# Patient Record
Sex: FEMALE | Race: WHITE | Employment: OTHER | URBAN - METROPOLITAN AREA
[De-identification: names, ages, dates, MRNs, and addresses within clinical notes are randomized per-mention and may not be internally consistent; named-entity substitution may affect disease eponyms.]

---

## 2017-06-22 ENCOUNTER — TELEPHONE (OUTPATIENT)
Dept: ORTHOPEDIC SURGERY | Age: 77
End: 2017-06-22

## 2017-06-26 RX ORDER — AMOXICILLIN 500 MG/1
CAPSULE ORAL
Qty: 4 CAPSULE | Refills: 2 | Status: SHIPPED | OUTPATIENT
Start: 2017-06-26

## 2020-02-07 ENCOUNTER — HOSPITAL ENCOUNTER (OUTPATIENT)
Dept: PHYSICAL THERAPY | Age: 80
Setting detail: THERAPIES SERIES
Discharge: HOME | End: 2020-02-07
Payer: MEDICARE

## 2020-02-07 PROCEDURE — 97016 VASOPNEUMATIC DEVICE THERAPY: CPT

## 2020-02-07 PROCEDURE — 97161 PT EVAL LOW COMPLEX 20 MIN: CPT

## 2020-02-07 PROCEDURE — 97110 THERAPEUTIC EXERCISES: CPT

## 2020-02-07 NOTE — PLAN OF CARE
[facility]  Phone: [phone]   Fax:     [phone]                                                       Physical Therapy Certification    Dear Referring Practitioner: Susan Carmona,    We had the pleasure of evaluating the following patient for physical therapy services at [address] [place] [address]. A summary of our findings can be found in the initial assessment below. This includes our plan of care. If you have any questions or concerns regarding these findings, please do not hesitate to contact me at the office phone number checked above. Thank you for the referral.       Physician Signature:_______________________________Date:__________________  By signing above (or electronic signature), therapists plan is approved by physician      Patient: Yusra Kumar   : [birth year]   MRN: [id number]  Referring Physician: Referring Practitioner: Susan Carmona      Evaluation Date: 2020      Medical Diagnosis Information:  Diagnosis: L knee OA s/p L TKA - 2/3/20   Treatment Diagnosis: M17.11, Z96.659                                         Insurance information: PT Insurance Information: Aetna Medicare     Precautions/ Contra-indications: None  Latex Allergy:  [x]NO      []YES  Preferred Language for Healthcare:   [x]English       []other:    SUBJECTIVE: Patient stated complaint: Patient presents to physical therapy 4 days s/p left TKA on 2/3/20. Reports feeling really great for first couple days post-op, was trying to walk around the house every hour as instructed and was even able to stand to help make dinner. But today and yesterday she has been quite a bit more sore. She is taking Percocet as needed for pain control. Icing and elevating as instructed. Currently ambulating with a wheeled walker.  Not really having much difficulty sleeping. Relevant Medical History: Right TKA 7 years ago, L MICHAEL May 2016  Functional Scale/Score: LEFS - 86% impairment    Pain Scale: 6/10  Easing factors: pain meds PRN, ice, elevation  Provocative factors: sitting, standing, transfers, ambulation, squatting, stairs, kneeling     Type: []Constant   [x]Intermittent  []Radiating [x]Localized []other:     Numbness/Tingling: Present in anterior knee    Occupation/School: Retired teacher    Living Status/Prior Level of Function: Independent with ADLs and IADLs, able to exercise (walking, Pilates reformer) without increased symptoms or restriction    OBJECTIVE:     ROM LEFT RIGHT   Knee ext -6 0   Knee Flex 75 120   Strength  LEFT RIGHT   HIP Flexors     HIP Abductors 4/5 5/5   HIP Ext     Hip ER     Knee EXT (quad) Trace 5/5   Knee Flex (HS) 3+/5 5/5     Reflexes/Sensation:    [x]Dermatomes/Myotomes intact    [x]Reflexes equal and normal bilaterally   []Other:    Joint mobility:    []Normal    [x]Hypo - L knee   []Hyper    Palpation: tender to palpation of posterior knee    Functional Mobility/Transfers: UE assist for sit to stand, min assist to lift LLE for supine-to-sit transfers    Posture: WNL    Bandages/Dressings/Incisions: Dressing still in place, no signs of infection    Gait: (include devices/WB status) antalgic with use of wheeled walker, poor left push off with decreased left knee flexion during swing    Orthopedic Special Tests: NA                       [x] Patient history, allergies, meds reviewed. Medical chart reviewed. See intake form. Review Of Systems (ROS):  [x]Performed Review of systems (Integumentary, CardioPulmonary, Neurological) by intake and observation. Intake form has been scanned into medical record. Patient has been instructed to contact their primary care physician regarding ROS issues if not already being addressed at this time.       Co-morbidities/Complexities (which will affect course of rehabilitation):   []None Arthritic conditions   []Rheumatoid arthritis (M05.9)  [x]Osteoarthritis (M19.91)   Cardiovascular conditions   [x]Hypertension (I10)  []Hyperlipidemia (E78.5)  []Angina pectoris (I20)  []Atherosclerosis (I70)  []CVA Musculoskeletal conditions   []Disc pathology   []Congenital spine pathologies   []Prior surgical intervention  []Osteoporosis (M81.8)  [x]Osteopenia (M85.8)   Endocrine conditions   []Hypothyroid (E03.9)  []Hyperthyroid Gastrointestinal conditions   []Constipation (O30.83)   Metabolic conditions   []Morbid obesity (E66.01)  []Diabetes type 1(E10.65) or 2 (E11.65)   []Neuropathy (G60.9)     Pulmonary conditions   []Asthma (J45)  []Coughing   []COPD (J44.9)   Psychological Disorders  []Anxiety (F41.9)  []Depression (F32.9)   []Other:   [x]Other:   Hx of CA       Barriers to/and or personal factors that will affect rehab potential:              []Age  []Sex    []Smoker              []Motivation/Lack of Motivation                        []Co-Morbidities              []Cognitive Function, education/learning barriers              []Environmental, home barriers              []profession/work barriers  []past PT/medical experience  []other:  Justification: No barriers noted    Falls Risk Assessment (30 days):   [x] Falls Risk assessed and no intervention required. [] Falls Risk assessed and Patient requires intervention due to being higher risk   TUG score (>12s at risk):     [] Falls education provided, including         ASSESSMENT: Patient is a 61-year-old female who presents to physical therapy 4 days s/p left TKA.    Functional Impairments:     [x]Noted lumbar/proximal hip/LE hypomobility   [x]Decreased LE functional ROM   [x]Decreased core/proximal hip strength and neuromuscular control   [x]Decreased LE functional strength   [x]Reduced balance/proprioceptive control   []other:      Functional Activity Limitations (from functional questionnaire and intake)   [x]Reduced ability to tolerate prolonged

## 2020-02-07 NOTE — FLOWSHEET NOTE
[facility]  [address] [address] [facility]  Phone: [phone] Fax: [phone]    Physical Therapy Treatment Note/ Progress Report:     Date:  2020    Patient Name:  Ivonne Field    :  11[birth year]  MRN: [id number]  Restrictions/Precautions:    Medical/Treatment Diagnosis Information:  · Diagnosis: L knee OA s/p L TKA - 2/3/20  · Treatment Diagnosis: M17.11, I03.235  Insurance/Certification information:  PT Insurance Information: Aetna Medicare  Physician Information:  Referring Practitioner: Yordy Ash  Plan of care signed (Y/N):     Date of Patient follow up with Physician: 20     Progress Report: []  Yes  [x]  No     Date Range for reporting period:  Beginnin20  Ending:  NA    Progress report due (10 Rx/or 30 days whichever is less): 85     Recertification due (POC duration/ or 90 days whichever is less): 4/3/20     Visit # Insurance Allowable Auth Needed   1 Medical necessity []Yes   [x]No     Latex Allergy:  [x]NO      []YES  Preferred Language for Healthcare:   [x]English       []other:  Functional Scale: LEFS - 86% impairment   Date assessed:20    Pain level:  6/10     SUBJECTIVE:  See eval    OBJECTIVE: See eval   Observation:    Test measurements:      RESTRICTIONS/PRECAUTIONS: Hx of L MICHAEL, R TKA    Exercises/Interventions:     Therapeutic Ex (86454)   Min: 20 Sets/sec Reps Notes/CUES   Retro Stepper/BIKE      Alter G      Supine heel slides 10 10 With strap   QS 5 10 Supine, towel under knee   Long sitting calf stretch 30 3 towel   Long sitting HS stretch 30 3    Heel prop 2'  towel   Sportcord March      SLR 1 5 0# w/ manual assist   SAQ      Clam ABD      Hip Ext [place]      BOSU fwd/side lunge      BOSU squat      Leg Press Iso/Con/Ecc 0-      Cybex HS curl      TKE      Glute side walks      RDL      Slide Lunge      Slide HS eccentrics      Step ups/ecc step down      Swissball wall rolls- in SLS- (30800)Reviewed/Progressed HEP activities related to improving balance, coordination, kinesthetic sense, posture, motor skill, proprioception of core, proximal hip and LE for self care, mobility, lifting, and ambulation/stair navigation      Manual Treatments:  PROM / STM / Oscillations-Mobs:  G-I, II, III, IV (PA's, Inf., Post.)  [] (45991) Provided manual therapy to mobilize LE, proximal hip and/or LS spine soft tissue/joints for the purpose of modulating pain, promoting relaxation,  increasing ROM, reducing/eliminating soft tissue swelling/inflammation/restriction, improving soft tissue extensibility and allowing for proper ROM for normal function with self care, mobility, lifting and ambulation. Modalities:     [x] GAME READY (VASO)- for significant edema, swelling, pain control. - to L knee with patient in supine with LLE elevated on 2 pillows, moderate compression x 10 minutes    Charges:  Timed Code Treatment Minutes: 20   Total Treatment Minutes: 48      [x] EVAL (LOW) 05696 (typically 20 minutes face-to-face)  [] EVAL (MOD) 68771 (typically 30 minutes face-to-face)  [] EVAL (HIGH) 31074 (typically 45 minutes face-to-face)  [] RE-EVAL     [x] DD(11651) x 1    [] IONTO (60136)  [] NMR (21945) x     [x] VASO (26425)  [] Manual (57485) x     [] Other:  [] TA (13381)x     [] Mech Traction (44524)  [] ES(attended) (75009)     [] ES (un) (14979): If [facility] Please Indicate Time In/Out  CPT Code Time in Time out                                   GOALS:  Patient stated goal: pain-free, return to PLOF including unlimited walking/exercise  [] Progressing: [] Met: [] Not Met: [] Adjusted    Therapist goals for Patient:   Short Term Goals: To be achieved in: 2 weeks  1. Independent in HEP and progression per patient tolerance, in order to prevent re-injury. [] Progressing: [] Met: [] Not Met: [] Adjusted  2.  Patient will have a decrease in pain to facilitate improvement in movement, function, and ADLs as indicated by Functional Deficits. [] Progressing: [] Met: [] Not Met: [] Adjusted    Long Term Goals: To be achieved in: 8 weeks  1. Disability index score of 30% or less for the LEFS to assist with reaching prior level of function. [] Progressing: [] Met: [] Not Met: [] Adjusted  2. Patient will demonstrate increased AROM to 0-120 or greater to allow for proper joint functioning as indicated by patients Functional Deficits. [] Progressing: [] Met: [] Not Met: [] Adjusted  3. Patient will demonstrate an increase in Strength to good proximal hip strength and control, within 5lb HHD in LE to allow for proper functional mobility as indicated by patients Functional Deficits. [] Progressing: [] Met: [] Not Met: [] Adjusted  4. Patient will return to sitting, standing, transfers, ambulation, squatting, stairs, kneeling  functional activities without increased symptoms or restriction. [] Progressing: [] Met: [] Not Met: [] Adjusted    ASSESSMENT:  See eval    Return to Play: (if applicable)   []  Stage 1: Intro to Strength   []  Stage 2: Return to Run and Strength   []  Stage 3: Return to Jump and Strength   []  Stage 4: Dynamic Strength and Agility   []  Stage 5: Sport Specific Training     []  Ready to Return to Play, Meets All Above Stages   []  Not Ready for Return to Sports   Comments:            Treatment/Activity Tolerance:  [x] Patient tolerated treatment well [] Patient limited by fatique  [] Patient limited by pain  [] Patient limited by other medical complications  [] Other:     Overall Progression Towards Functional goals/ Treatment Progress Update:  [] Patient is progressing as expected towards functional goals listed. [] Progression is slowed due to complexities/Impairments listed. [] Progression has been slowed due to co-morbidities.   [x] Plan just implemented, too soon to assess goals progression <30days   [] Goals require adjustment due to lack of progress  [] Patient is not progressing as expected and requires additional follow up with physician  [] Other    Prognosis for POC: [x] Good [] Fair  [] Poor    Patient requires continued skilled intervention: [x] Yes  [] No        PLAN: See eval  [] Continue per plan of care [] Alter current plan (see comments)  [x] Plan of care initiated [] Hold pending MD visit [] Discharge    Electronically signed by: Amparo Warner PT    Note: If patient does not return for scheduled/recommended follow up visits, this note will serve as a discharge from care along with the most recent update on progress.

## 2020-02-10 ENCOUNTER — HOSPITAL ENCOUNTER (OUTPATIENT)
Dept: PHYSICAL THERAPY | Age: 80
Setting detail: THERAPIES SERIES
Discharge: HOME | End: 2020-02-10
Payer: MEDICARE

## 2020-02-10 PROCEDURE — 97140 MANUAL THERAPY 1/> REGIONS: CPT

## 2020-02-10 PROCEDURE — 97110 THERAPEUTIC EXERCISES: CPT

## 2020-02-10 NOTE — FLOWSHEET NOTE
[place] [address] [address] [facility]  Phone: [phone] Fax: [phone]    Physical Therapy Treatment Note/ Progress Report:     Date:  2/10/2020    Patient Name:  Allen Irwin    :  [birth year]  MRN: [id number]  Restrictions/Precautions:    Medical/Treatment Diagnosis Information:  · Diagnosis: L knee OA s/p L TKA - 2/3/20  · Treatment Diagnosis: M17.11, P63.089  Insurance/Certification information:  PT Insurance Information: Aetna Medicare  Physician Information:  Referring Practitioner: Ester Forbes  Plan of care signed (Y/N):     Date of Patient follow up with Physician: 20     Progress Report: []  Yes  [x]  No     Date Range for reporting period:  Beginnin20  Ending:  NA    Progress report due (10 Rx/or 30 days whichever is less): 99     Recertification due (POC duration/ or 90 days whichever is less): 4/3/20     Visit # Insurance Allowable Auth Needed   2 Medical necessity []Yes   [x]No     Latex Allergy:  [x]NO      []YES  Preferred Language for Healthcare:   [x]English       []other:  Functional Scale: LEFS - 86% impairment   Date assessed:20    Pain level:  6/10     SUBJECTIVE:  Patient reports left knee is somewhat sore today. Having a hard time performing HEP SLR without assistance.      OBJECTIVE: tender through left knee, knee flexion approximately 80%   Observation: improved knee flexion during swing phase after treatment   Test measurements:      RESTRICTIONS/PRECAUTIONS: Hx of L MICHAEL, R TKA    Exercises/Interventions:     Therapeutic Ex ()   Min: 22 Sets/sec Reps Notes/CUES   Retro Stepper/BIKE      Calf stretch 30\" 3          QS 5 10 Supine, towel under knee   Long sitting calf stretch towel   Long sitting HS stretch    Heel prop   towel   Sportcord March      SLR 1 10 0# w/ manual assist   SL hip ABD 1 10    Calf raise 1 10    Standing hip abd 1 10 Each leg   BOSU fwd/side lunge      BOSU squat      Leg Press Iso/Con/Ecc 0-      HS curl 1 10    TKE      Glute side walks      RDL      Slide Lunge      Slide HS eccentrics      Step ups/ecc step down      Swissball wall rolls- in SLS- hip drive      Quad hip ext/wall-ball rolls      Gait training 5'           Manual Intervention (22991)  Min: 15'      Knee mobs/PROM/STM 15'     Tib/Fem Mobs      Patella Mobs      Ankle mobs                  NMR re-education (08529)  Min:   CUES NEEDED   Northern [place]/Biofeedback 10/10      BFR      G. Med activation      Hip Ext full ROM/ G. Activation      Bosu Bal and Prop- G Med      Single leg stance/Balance/Prop      Bosu Retro G. Med act      Prone Hip froggers- sliders/elevated            Therapeutic Activity (31787)  Min:      Ladders      [place]      Dynamic Balance                            Therapeutic Exercise and NMR EXR  [x] (37048) Provided verbal/tactile cueing for activities related to strengthening, flexibility, endurance, ROM for improvements in LE, proximal hip, and core control with self care, mobility, lifting, ambulation. [x] (01795) Provided verbal/tactile cueing for activities related to improving balance, coordination, kinesthetic sense, posture, motor skill, proprioception  to assist with LE, proximal hip, and core control in self care, mobility, lifting, ambulation and eccentric single leg control.      NMR and Therapeutic Activities:    [x] (40358 or 57041) Provided verbal/tactile cueing for activities related to improving balance, coordination, kinesthetic sense, posture, motor skill, proprioception and motor activation to allow for proper function of core, proximal hip and LE with self care and ADLs and functional mobility.   [] (31130) Gait Re-education- Provided training and instruction to the patient for proper LE, core and proximal hip recruitment and positioning and eccentric body weight control with ambulation re-education including up and down stairs     Home Exercise Program:    [x] (90236) Reviewed/Progressed HEP activities related to strengthening, flexibility, endurance, ROM of core, proximal hip and LE for functional self-care, mobility, lifting and ambulation/stair navigation   [] (51937)Reviewed/Progressed HEP activities related to improving balance, coordination, kinesthetic sense, posture, motor skill, proprioception of core, proximal hip and LE for self care, mobility, lifting, and ambulation/stair navigation      Manual Treatments:  PROM / STM / Oscillations-Mobs:  G-I, II, III, IV (PA's, Inf., Post.)  [] (09538) Provided manual therapy to mobilize LE, proximal hip and/or LS spine soft tissue/joints for the purpose of modulating pain, promoting relaxation,  increasing ROM, reducing/eliminating soft tissue swelling/inflammation/restriction, improving soft tissue extensibility and allowing for proper ROM for normal function with self care, mobility, lifting and ambulation. Modalities:     [] GAME READY (VASO)- for significant edema, swelling, pain control. - to L knee with patient in supine with LLE elevated on 2 pillows, moderate compression x 10 minutes    Charges:  Timed Code Treatment Minutes: 37   Total Treatment Minutes: 37      [] EVAL (LOW) 86479 (typically 20 minutes face-to-face)  [] EVAL (MOD) 24404 (typically 30 minutes face-to-face)  [] EVAL (HIGH) 29180 (typically 45 minutes face-to-face)  [] RE-EVAL     [x] ZD(38884) x 1    [] IONTO (20297)  [] NMR (88178) x     [] VASO (56485)  [x] Manual (69068) x1     [] Other:  [] TA (33283)x     [] Mech Traction (79757)  [] ES(attended) (78711)     [] ES (un) (90854): If [facility] Please Indicate Time In/Out  CPT Code Time in Time out                                   GOALS:  Patient stated goal: pain-free, return to PLOF including unlimited walking/exercise  [] Progressing: [] Met: [] Not Met: [] Adjusted    Therapist goals for Patient:   Short Term Goals: To be achieved in: 2 weeks  1.  Independent in HEP and progression per

## 2020-02-12 ENCOUNTER — HOSPITAL ENCOUNTER (OUTPATIENT)
Dept: PHYSICAL THERAPY | Age: 80
Setting detail: THERAPIES SERIES
Discharge: HOME | End: 2020-02-12
Payer: MEDICARE

## 2020-02-12 PROCEDURE — 97140 MANUAL THERAPY 1/> REGIONS: CPT

## 2020-02-12 PROCEDURE — 97110 THERAPEUTIC EXERCISES: CPT

## 2020-02-12 NOTE — FLOWSHEET NOTE
[place] [address] [address] [place] [facility]  Phone: [phone] Fax: [phone]    Physical Therapy Treatment Note/ Progress Report:     Date:  2020    Patient Name:  Taylor Dutta    :  [birth year]10  MRN: [id number]  Restrictions/Precautions:    Medical/Treatment Diagnosis Information:  · Diagnosis: L knee OA s/p L TKA - 2/3/20  · Treatment Diagnosis: M17.11, J73.422  Insurance/Certification information:  PT Insurance Information: Aetna Medicare  Physician Information:  Referring Practitioner: Norine Dubin  Plan of care signed (Y/N):     Date of Patient follow up with Physician: 20     Progress Report: []  Yes  [x]  No     Date Range for reporting period:  Beginnin20  Ending:  NA    Progress report due (10 Rx/or 30 days whichever is less): 3/1/71     Recertification due (POC duration/ or 90 days whichever is less): 4/3/20     Visit # Insurance Allowable Auth Needed   3 Medical necessity []Yes   [x]No     Latex Allergy:  [x]NO      []YES  Preferred Language for Healthcare:   [x]English       []other:  Functional Scale: LEFS - 86% impairment   Date assessed:20    Pain level:  5/10     SUBJECTIVE:  Patient reports left knee is doing better. She feels that she is better able to perform HEP with good quad activation. Reports 5/10 pain levels when she does certain movements.     OBJECTIVE: improved quad activation, no assist required for SLR   Observation: improved knee flexion during swing phase after treatment, able to walk with standard cane   Test measurements:      RESTRICTIONS/PRECAUTIONS: Hx of L MICHAEL, R TKA    Exercises/Interventions:     Therapeutic Ex (86874)   Min: 30 Sets/sec Reps Notes/CUES   Retro Stepper/BIKE 5'     Calf stretch 30\" 3          QS 5 10 Supine, towel under knee   Long sitting calf stretch towel   Long sitting HS stretch    Heel prop   towel   Sportcord     SLR 1 10 0# w/ manual assist

## 2020-02-17 ENCOUNTER — HOSPITAL ENCOUNTER (OUTPATIENT)
Dept: PHYSICAL THERAPY | Age: 80
Setting detail: THERAPIES SERIES
Discharge: HOME | End: 2020-02-17
Payer: MEDICARE

## 2020-02-17 PROCEDURE — 97140 MANUAL THERAPY 1/> REGIONS: CPT

## 2020-02-17 PROCEDURE — 97110 THERAPEUTIC EXERCISES: CPT

## 2020-02-17 NOTE — FLOWSHEET NOTE
[place] [address] [address] [facility]  Phone: [phone] Fax: [phone]    Physical Therapy Treatment Note/ Progress Report:     Date:  2020    Patient Name:  Dayton Bower    :  [birth year]  MRN: [id number]  Restrictions/Precautions:    Medical/Treatment Diagnosis Information:  · Diagnosis: L knee OA s/p L TKA - 2/3/20  · Treatment Diagnosis: M17.11, D88.663  Insurance/Certification information:  PT Insurance Information: Aetna Medicare  Physician Information:  Referring Practitioner: Frank Moreno  Plan of care signed (Y/N):     Date of Patient follow up with Physician: 20     Progress Report: []  Yes  [x]  No     Date Range for reporting period:  Beginnin20  Ending:  NA    Progress report due (10 Rx/or 30 days whichever is less): 99     Recertification due (POC duration/ or 90 days whichever is less): 4/3/20     Visit # Insurance Allowable Auth Needed   4 Medical necessity []Yes   [x]No     Latex Allergy:  [x]NO      []YES  Preferred Language for Healthcare:   [x]English       []other:  Functional Scale: LEFS - 86% impairment   Date assessed:20    Pain level:  5/10     SUBJECTIVE:  Patient reports overall doing well with using cane for ambulation since last week. She feels HEP is going well and that she is getting a better quad contraction everyday. OBJECTIVE: able to increase exercise intensity and volume today with appropriate fatigue noted at end of treatment.     Observation:   Test measurements:      RESTRICTIONS/PRECAUTIONS: Hx of L MICHAEL, R TKA    Exercises/Interventions:     Therapeutic Ex (50685)   Min: 35 Sets/sec Reps Notes/CUES   Retro Stepper/BIKE 5'     Calf stretch 30\" 3          QS 5 10 Supine, towel under knee   Long sitting calf stretch towel   Long sitting HS stretch    Heel prop   towel   Sportcord     SLR 2 10 0# w/ manual assist   SL hip ABD 2 10    Calf raise 2 10

## 2020-02-19 ENCOUNTER — HOSPITAL ENCOUNTER (OUTPATIENT)
Dept: PHYSICAL THERAPY | Age: 80
Setting detail: THERAPIES SERIES
Discharge: HOME | End: 2020-02-19
Payer: MEDICARE

## 2020-02-19 PROCEDURE — 97110 THERAPEUTIC EXERCISES: CPT

## 2020-02-19 PROCEDURE — 97140 MANUAL THERAPY 1/> REGIONS: CPT

## 2020-02-19 PROCEDURE — 97016 VASOPNEUMATIC DEVICE THERAPY: CPT

## 2020-02-19 NOTE — FLOWSHEET NOTE
goals/ Treatment Progress Update:  [x] Patient is progressing as expected towards functional goals listed. [] Progression is slowed due to complexities/Impairments listed. [] Progression has been slowed due to co-morbidities. [] Plan just implemented, too soon to assess goals progression <30days   [] Goals require adjustment due to lack of progress  [] Patient is not progressing as expected and requires additional follow up with physician  [] Other    Prognosis for POC: [x] Good [] Fair  [] Poor    Patient requires continued skilled intervention: [x] Yes  [] No        PLAN:   [x] Continue per plan of care [] Alter current plan (see comments)  [] Plan of care initiated [] Hold pending MD visit [] Discharge    Electronically signed by: Sahara Jaimes PT    Note: If patient does not return for scheduled/recommended follow up visits, this note will serve as a discharge from care along with the most recent update on progress.

## 2020-02-24 ENCOUNTER — HOSPITAL ENCOUNTER (OUTPATIENT)
Dept: PHYSICAL THERAPY | Age: 80
Setting detail: THERAPIES SERIES
Discharge: HOME | End: 2020-02-24
Payer: MEDICARE

## 2020-02-24 PROCEDURE — 97140 MANUAL THERAPY 1/> REGIONS: CPT

## 2020-02-24 PROCEDURE — 97110 THERAPEUTIC EXERCISES: CPT

## 2020-02-24 NOTE — FLOWSHEET NOTE
[place] [address] [address]  Phone: [phone] Fax: [phone]    Physical Therapy Treatment Note/ Progress Report:     Date:  2020    Patient Name:  Ivonne Field    :  [id number]  MRN: [id number]  Restrictions/Precautions:    Medical/Treatment Diagnosis Information:  · Diagnosis: L knee OA s/p L TKA - 2/3/20  · Treatment Diagnosis: M17.11, F64.171  Insurance/Certification information:  PT Insurance Information: [employer]  Physician Information:  Referring Practitioner: Yordy Ash  Plan of care signed (Y/N):     Date of Patient follow up with Physician: 20     Progress Report: []  Yes  [x]  No     Date Range for reporting period:  Beginnin20  Ending:  NA    Progress report due (10 Rx/or 30 days whichever is less): 1/3/88     Recertification due (POC duration/ or 90 days whichever is less): 4/3/20     Visit # Insurance Allowable Auth Needed   6 Medical necessity []Yes   [x]No     Latex Allergy:  [x]NO      []YES  Preferred Language for Healthcare:   [x]English       []other:  Functional Scale: LEFS - 86% impairment   Date assessed:20    Pain level:  5/10     SUBJECTIVE:  Patient reports that her knee is doing well. States that she is usually sore the night of therapy but feels that she is making progress. OBJECTIVE: Held leg press today due to soreness after Monday.     Observation: removed dressing which felt better with PROM, added GameReady to reduce DOMS and for decreased effusion following treatment   Test measurements:      RESTRICTIONS/PRECAUTIONS: Hx of L MICHAEL, R TKA    Exercises/Interventions:     Therapeutic Ex (63309)   Min: 35 Sets/sec Reps Notes/CUES   Retro Stepper/BIKE 5'     Calf stretch 30\" 3          QS 5 10 Supine, towel under knee   Long sitting calf stretch towel   Long sitting HS stretch    Heel prop   towel   Sportcord     SLR 2 10 0# w/ manual assist   SL hip Short Term Goals: To be achieved in: 2 weeks  1. Independent in HEP and progression per patient tolerance, in order to prevent re-injury. [] Progressing: [] Met: [] Not Met: [] Adjusted  2. Patient will have a decrease in pain to facilitate improvement in movement, function, and ADLs as indicated by Functional Deficits. [] Progressing: [] Met: [] Not Met: [] Adjusted    Long Term Goals: To be achieved in: 8 weeks  1. Disability index score of 30% or less for the LEFS to assist with reaching prior level of function. [] Progressing: [] Met: [] Not Met: [] Adjusted  2. Patient will demonstrate increased AROM to 0-120 or greater to allow for proper joint functioning as indicated by patients Functional Deficits. [] Progressing: [] Met: [] Not Met: [] Adjusted  3. Patient will demonstrate an increase in Strength to good proximal hip strength and control, within 5lb HHD in LE to allow for proper functional mobility as indicated by patients Functional Deficits. [] Progressing: [] Met: [] Not Met: [] Adjusted  4. Patient will return to sitting, standing, transfers, ambulation, squatting, stairs, kneeling  functional activities without increased symptoms or restriction. [] Progressing: [] Met: [] Not Met: [] Adjusted    ASSESSMENT:  Good tolerance to treatment. Appropriately fatigued at conclusion. No complaints of increased pain. Continue to progress activity as tolerated.     Return to Play: (if applicable)   []  Stage 1: Intro to Strength   []  Stage 2: Return to Run and Strength   []  Stage 3: Return to Jump and Strength   []  Stage 4: Dynamic Strength and Agility   []  Stage 5: Sport Specific Training     []  Ready to Return to Play, Meets All Above Stages   []  Not Ready for Return to Sports   Comments:            Treatment/Activity Tolerance:  [x] Patient tolerated treatment well [] Patient limited by fatique  [] Patient limited by pain  [] Patient limited by other medical complications  [] Other: Overall Progression Towards Functional goals/ Treatment Progress Update:  [x] Patient is progressing as expected towards functional goals listed. [] Progression is slowed due to complexities/Impairments listed. [] Progression has been slowed due to co-morbidities. [] Plan just implemented, too soon to assess goals progression <30days   [] Goals require adjustment due to lack of progress  [] Patient is not progressing as expected and requires additional follow up with physician  [] Other    Prognosis for POC: [x] Good [] Fair  [] Poor    Patient requires continued skilled intervention: [x] Yes  [] No        PLAN:   [x] Continue per plan of care [] Alter current plan (see comments)  [] Plan of care initiated [] Hold pending MD visit [] Discharge    Electronically signed by: Robert Grey PTA    Note: If patient does not return for scheduled/recommended follow up visits, this note will serve as a discharge from care along with the most recent update on progress.

## 2020-02-26 ENCOUNTER — HOSPITAL ENCOUNTER (OUTPATIENT)
Dept: PHYSICAL THERAPY | Age: 80
Setting detail: THERAPIES SERIES
Discharge: HOME | End: 2020-02-26
Payer: MEDICARE

## 2020-02-26 PROCEDURE — 97016 VASOPNEUMATIC DEVICE THERAPY: CPT

## 2020-02-26 PROCEDURE — 97140 MANUAL THERAPY 1/> REGIONS: CPT

## 2020-02-26 PROCEDURE — 97110 THERAPEUTIC EXERCISES: CPT

## 2020-02-26 NOTE — FLOWSHEET NOTE
15' 2 orange   RDL      Slide Lunge      Slide HS eccentrics      Step ups 2 10 4\"   Swissball wall rolls- in SLS- hip drive      Quad hip ext/wall-ball rolls      Gait training 5'           Manual Intervention (55942)  Min: 15'      Knee mobs/PROM/STM 15'     Tib/Fem Mobs      Patella Mobs      Ankle mobs                  NMR re-education (01884)  Min:   CUES NEEDED   [place]i/Biofeedback 10/10      BFR      G. Med activation      Hip Ext full ROM/ G. Activation      Bosu Bal and Prop- G Med      Single leg stance/Balance/Prop      Bosu Retro G. Med act      Prone Hip froggers- sliders/elevated            Therapeutic Activity (42433)  Min:      Ladders      Plyos      Dynamic Balance                            Therapeutic Exercise and NMR EXR  [x] (16243) Provided verbal/tactile cueing for activities related to strengthening, flexibility, endurance, ROM for improvements in LE, proximal hip, and core control with self care, mobility, lifting, ambulation. [x] (78237) Provided verbal/tactile cueing for activities related to improving balance, coordination, kinesthetic sense, posture, motor skill, proprioception  to assist with LE, proximal hip, and core control in self care, mobility, lifting, ambulation and eccentric single leg control.      NMR and Therapeutic Activities:    [x] (88624 or 73348) Provided verbal/tactile cueing for activities related to improving balance, coordination, kinesthetic sense, posture, motor skill, proprioception and motor activation to allow for proper function of core, proximal hip and LE with self care and ADLs and functional mobility.   [] (41183) Gait Re-education- Provided training and instruction to the patient for proper LE, core and proximal hip recruitment and positioning and eccentric body weight control with ambulation re-education including up and down stairs     Home Exercise Program:    [x] (75750) Reviewed/Progressed HEP activities related to strengthening, flexibility, endurance, ROM of core, proximal hip and LE for functional self-care, mobility, lifting and ambulation/stair navigation   [] (48951)Reviewed/Progressed HEP activities related to improving balance, coordination, kinesthetic sense, posture, motor skill, proprioception of core, proximal hip and LE for self care, mobility, lifting, and ambulation/stair navigation      Manual Treatments:  PROM / STM / Oscillations-Mobs:  G-I, II, III, IV (PA's, Inf., Post.)  [x] (34185) Provided manual therapy to mobilize LE, proximal hip and/or LS spine soft tissue/joints for the purpose of modulating pain, promoting relaxation,  increasing ROM, reducing/eliminating soft tissue swelling/inflammation/restriction, improving soft tissue extensibility and allowing for proper ROM for normal function with self care, mobility, lifting and ambulation. Modalities:     [x] GAME READY (VASO)- for significant edema, swelling, pain control. - to L knee with patient in supine with LLE elevated on 2 pillows, moderate compression x 10 minutes    Charges:  Timed Code Treatment Minutes: 50   Total Treatment Minutes: 60      [] EVAL (LOW) 13838 (typically 20 minutes face-to-face)  [] EVAL (MOD) 04504 (typically 30 minutes face-to-face)  [] EVAL (HIGH) 15975 (typically 45 minutes face-to-face)  [] RE-EVAL     [x] AU(02512) x 2    [] IONTO (20963)  [] NMR (21922) x     [x] VASO (26434)  [x] Manual (77221) x1     [] Other:  [] TA (09161)x     [] Mech Traction (99283)  [] ES(attended) (55199)     [] ES (un) (69741): If BW Please Indicate Time In/Out  CPT Code Time in Time out                                   GOALS:  Patient stated goal: pain-free, return to PLOF including unlimited walking/exercise  [] Progressing: [] Met: [] Not Met: [] Adjusted     Therapist goals for Patient:   Short Term Goals: To be achieved in: 2 weeks  1. Independent in HEP and progression per patient tolerance, in order to prevent re-injury.    [] Progressing: [] Met: [] Not Met: [] Adjusted  2. Patient will have a decrease in pain to facilitate improvement in movement, function, and ADLs as indicated by Functional Deficits. [] Progressing: [] Met: [] Not Met: [] Adjusted    Long Term Goals: To be achieved in: 8 weeks  1. Disability index score of 30% or less for the LEFS to assist with reaching prior level of function. [] Progressing: [] Met: [] Not Met: [] Adjusted  2. Patient will demonstrate increased AROM to 0-120 or greater to allow for proper joint functioning as indicated by patients Functional Deficits. [] Progressing: [] Met: [] Not Met: [] Adjusted  3. Patient will demonstrate an increase in Strength to good proximal hip strength and control, within 5lb HHD in LE to allow for proper functional mobility as indicated by patients Functional Deficits. [] Progressing: [] Met: [] Not Met: [] Adjusted  4. Patient will return to sitting, standing, transfers, ambulation, squatting, stairs, kneeling  functional activities without increased symptoms or restriction. [] Progressing: [] Met: [] Not Met: [] Adjusted    ASSESSMENT:  Patient is making good gains with ROM of left knee and strength is progressing well. She was able to tolerate increase in frequency of exercises today. Return to Play: (if applicable)   []  Stage 1: Intro to Strength   []  Stage 2: Return to Run and Strength   []  Stage 3: Return to Jump and Strength   []  Stage 4: Dynamic Strength and Agility   []  Stage 5: Sport Specific Training     []  Ready to Return to Play, Meets All Above Stages   []  Not Ready for Return to Sports   Comments:            Treatment/Activity Tolerance:  [x] Patient tolerated treatment well [] Patient limited by fatique  [] Patient limited by pain  [] Patient limited by other medical complications  [] Other:     Overall Progression Towards Functional goals/ Treatment Progress Update:  [x] Patient is progressing as expected towards functional goals listed.     [] Progression is slowed due to complexities/Impairments listed. [] Progression has been slowed due to co-morbidities. [] Plan just implemented, too soon to assess goals progression <30days   [] Goals require adjustment due to lack of progress  [] Patient is not progressing as expected and requires additional follow up with physician  [] Other    Prognosis for POC: [x] Good [] Fair  [] Poor    Patient requires continued skilled intervention: [x] Yes  [] No        PLAN:   [x] Continue per plan of care [] Alter current plan (see comments)  [] Plan of care initiated [] Hold pending MD visit [] Discharge    Electronically signed by: Lita Lee PT    Note: If patient does not return for scheduled/recommended follow up visits, this note will serve as a discharge from care along with the most recent update on progress.

## 2020-03-02 ENCOUNTER — HOSPITAL ENCOUNTER (OUTPATIENT)
Dept: PHYSICAL THERAPY | Age: 80
Setting detail: THERAPIES SERIES
Discharge: HOME | End: 2020-03-02
Payer: MEDICARE

## 2020-03-02 PROCEDURE — 97110 THERAPEUTIC EXERCISES: CPT

## 2020-03-02 PROCEDURE — 97140 MANUAL THERAPY 1/> REGIONS: CPT

## 2020-03-04 ENCOUNTER — HOSPITAL ENCOUNTER (OUTPATIENT)
Dept: PHYSICAL THERAPY | Age: 80
Setting detail: THERAPIES SERIES
Discharge: HOME | End: 2020-03-04
Payer: MEDICARE

## 2020-03-04 PROCEDURE — 97140 MANUAL THERAPY 1/> REGIONS: CPT

## 2020-03-04 PROCEDURE — 97110 THERAPEUTIC EXERCISES: CPT

## 2020-03-04 NOTE — PLAN OF CARE
Recertification due (POC duration/ or 90 days whichever is less): 4/3/20     Visit # Insurance Allowable Auth Needed   9 Medical necessity []Yes   [x]No     Latex Allergy:  [x]NO      []YES  Preferred Language for Healthcare:   [x]English       []other:  Functional Scale: LEFS - 59% impairment   Date assessed:3/4/20    Pain level:  5/10     SUBJECTIVE:  Patient reports 75% improvement in left knee since beginning therapy. She is making gains with functional abilities however, is becoming frustrated with lack of change in endurance and with pain at night. OBJECTIVE:. Tender through left posterior knee   Observation: 0-116 left knee   Test measurements:  Ambulating without AD and without deviation    RESTRICTIONS/PRECAUTIONS: Hx of L MICHAEL, R TKA    Exercises/Interventions:     Therapeutic Ex (04041)   Min: 35 Sets/sec Reps Notes/CUES   Retro Stepper/BIKE 5'     Calf stretch 30\" 3          QS Supine, towel under knee   Long sitting calf stretch towel   Long sitting HS stretch    Heel prop   towel   Sportcord March 2 20    SLR 2 10 0# w/ manual assist   SL hip ABD 2 10    Calf raise 2 10    Standing hip abd Each leg   BOSU fwd/side lunge      BOSU squat      Leg Press  0- 2 10 60#   HS curl 2 10    TKE 2 20 3 plates   Glute side walks 15' 2 orange   RDL      Slide Lunge 2 10    Slide HS eccentrics      Step ups 2 10 6\"   Swissball wall rolls- in SLS- hip drive      Quad hip ext/wall-ball rolls      Gait training 5'           Manual Intervention (47787)  Min: 15'      Knee mobs/PROM/STM 15'     Tib/Fem Mobs      Patella Mobs      Ankle mobs                  NMR re-education (18943)  Min:   CUES NEEDED   [place]/Biofeedback 10/10      BFR      G. Med activation      Hip Ext full ROM/ G.  Activation      Bosu Bal and Prop- G Med      Single leg stance/Balance/Prop      Bosu Retro G. Med act      Prone Hip froggers- sliders/elevated            Therapeutic Activity (08603)  Min:      Ladders      Plyos      Dynamic Balance                            Therapeutic Exercise and NMR EXR  [x] (66998) Provided verbal/tactile cueing for activities related to strengthening, flexibility, endurance, ROM for improvements in LE, proximal hip, and core control with self care, mobility, lifting, ambulation. [x] (31154) Provided verbal/tactile cueing for activities related to improving balance, coordination, kinesthetic sense, posture, motor skill, proprioception  to assist with LE, proximal hip, and core control in self care, mobility, lifting, ambulation and eccentric single leg control.      NMR and Therapeutic Activities:    [x] (58788 or 31170) Provided verbal/tactile cueing for activities related to improving balance, coordination, kinesthetic sense, posture, motor skill, proprioception and motor activation to allow for proper function of core, proximal hip and LE with self care and ADLs and functional mobility.   [] (04342) Gait Re-education- Provided training and instruction to the patient for proper LE, core and proximal hip recruitment and positioning and eccentric body weight control with ambulation re-education including up and down stairs     Home Exercise Program:    [x] (93372) Reviewed/Progressed HEP activities related to strengthening, flexibility, endurance, ROM of core, proximal hip and LE for functional self-care, mobility, lifting and ambulation/stair navigation   [] (70256)Reviewed/Progressed HEP activities related to improving balance, coordination, kinesthetic sense, posture, motor skill, proprioception of core, proximal hip and LE for self care, mobility, lifting, and ambulation/stair navigation      Manual Treatments:  PROM / STM / Oscillations-Mobs:  G-I, II, III, IV (PA's, Inf., Post.)  [x] (03889) Provided manual therapy to mobilize LE, proximal hip and/or LS spine soft tissue/joints for the purpose of modulating pain, promoting relaxation,  increasing ROM, reducing/eliminating soft tissue proximal hip strength and control, within 5lb HHD in LE to allow for proper functional mobility as indicated by patients Functional Deficits. [x] Progressing: [] Met: [] Not Met: [] Adjusted  4. Patient will return to sitting, standing, transfers, ambulation, squatting, stairs, kneeling  functional activities without increased symptoms or restriction. [x] Progressing: [] Met: [] Not Met: [] Adjusted    ASSESSMENT:  Patient is making gains with therapy and should continue to progress per TKR progression with further therapy. She has limitations with endurance and higher level function, including driving, stairs and prolonged standing/walking. Return to Play: (if applicable)   []  Stage 1: Intro to Strength   []  Stage 2: Return to Run and Strength   []  Stage 3: Return to Jump and Strength   []  Stage 4: Dynamic Strength and Agility   []  Stage 5: Sport Specific Training     []  Ready to Return to Play, Meets All Above Stages   []  Not Ready for Return to Sports   Comments:            Treatment/Activity Tolerance:  [x] Patient tolerated treatment well [] Patient limited by fatique  [] Patient limited by pain  [] Patient limited by other medical complications  [] Other:     Overall Progression Towards Functional goals/ Treatment Progress Update:  [x] Patient is progressing as expected towards functional goals listed. [] Progression is slowed due to complexities/Impairments listed. [] Progression has been slowed due to co-morbidities.   [] Plan just implemented, too soon to assess goals progression <30days   [] Goals require adjustment due to lack of progress  [] Patient is not progressing as expected and requires additional follow up with physician  [] Other    Prognosis for POC: [x] Good [] Fair  [] Poor    Patient requires continued skilled intervention: [x] Yes  [] No        PLAN:   [x] Continue per plan of care [] Alter current plan (see comments)  [] Plan of care initiated [] Hold pending MD visit [] Discharge    Electronically signed by: Chante Perez PT    Note: If patient does not return for scheduled/recommended follow up visits, this note will serve as a discharge from care along with the most recent update on progress.

## 2020-03-09 ENCOUNTER — HOSPITAL ENCOUNTER (OUTPATIENT)
Dept: PHYSICAL THERAPY | Age: 80
Setting detail: THERAPIES SERIES
Discharge: HOME | End: 2020-03-09
Payer: MEDICARE

## 2020-03-09 PROCEDURE — 97110 THERAPEUTIC EXERCISES: CPT

## 2020-03-09 PROCEDURE — 97140 MANUAL THERAPY 1/> REGIONS: CPT

## 2020-03-09 PROCEDURE — 97112 NEUROMUSCULAR REEDUCATION: CPT

## 2020-03-09 NOTE — FLOWSHEET NOTE
Exercise Program:    [x] (26637) Reviewed/Progressed HEP activities related to strengthening, flexibility, endurance, ROM of core, proximal hip and LE for functional self-care, mobility, lifting and ambulation/stair navigation   [] (35728)Reviewed/Progressed HEP activities related to improving balance, coordination, kinesthetic sense, posture, motor skill, proprioception of core, proximal hip and LE for self care, mobility, lifting, and ambulation/stair navigation      Manual Treatments:  PROM / STM / Oscillations-Mobs:  G-I, II, III, IV (PA's, Inf., Post.)  [x] (37917) Provided manual therapy to mobilize LE, proximal hip and/or LS spine soft tissue/joints for the purpose of modulating pain, promoting relaxation,  increasing ROM, reducing/eliminating soft tissue swelling/inflammation/restriction, improving soft tissue extensibility and allowing for proper ROM for normal function with self care, mobility, lifting and ambulation. Modalities:     [x] GAME READY (VASO)- for significant edema, swelling, pain control. - to L knee with patient in supine with LLE elevated on 2 pillows, moderate compression x 10 minutes    Charges:  Timed Code Treatment Minutes: 60   Total Treatment Minutes: 70      [] EVAL (LOW) 41078 (typically 20 minutes face-to-face)  [] EVAL (MOD) 74337 (typically 30 minutes face-to-face)  [] EVAL (HIGH) 43535 (typically 45 minutes face-to-face)  [] RE-EVAL     [x] IA(90858) x 2    [] IONTO (69698)  [x] NMR (83726) x1     [] VASO (15949)  [x] Manual (64477) x1     [] Other:  [] TA (53509)x     [] [facility] (25634[facility]  [] ES(attended) (19308)     [] ES (un) (19429): If BW Please Indicate Time In/Out  CPT Code Time in Time out                                   GOALS:  Patient stated goal: pain-free, return to PLOF including unlimited walking/exercise  [] Progressing: [] Met: [] Not Met: [] Adjusted     Therapist goals for Patient:   Short Term Goals: To be achieved in: 2 weeks  1.  Independent in Patient is progressing as expected towards functional goals listed. [] Progression is slowed due to complexities/Impairments listed. [] Progression has been slowed due to co-morbidities. [] Plan just implemented, too soon to assess goals progression <30days   [] Goals require adjustment due to lack of progress  [] Patient is not progressing as expected and requires additional follow up with physician  [] Other    Prognosis for POC: [x] Good [] Fair  [] Poor    Patient requires continued skilled intervention: [x] Yes  [] No        PLAN:   [x] Continue per plan of care [] Alter current plan (see comments)  [] Plan of care initiated [] Hold pending MD visit [] Discharge    Electronically signed by: Parrish Villareal, PT    Note: If patient does not return for scheduled/recommended follow up visits, this note will serve as a discharge from care along with the most recent update on progress.

## 2020-03-11 ENCOUNTER — HOSPITAL ENCOUNTER (OUTPATIENT)
Dept: PHYSICAL THERAPY | Age: 80
Setting detail: THERAPIES SERIES
Discharge: HOME | End: 2020-03-11
Payer: MEDICARE

## 2020-03-11 PROCEDURE — 97140 MANUAL THERAPY 1/> REGIONS: CPT

## 2020-03-11 PROCEDURE — 97110 THERAPEUTIC EXERCISES: CPT

## 2020-03-11 PROCEDURE — 97112 NEUROMUSCULAR REEDUCATION: CPT

## 2020-03-11 NOTE — FLOWSHEET NOTE
10 60#   HS curl    TKE 2 20 3 plates   Glute side walks 15' 2 orange   RDL      Slide Lunge 1 10    Slide HS eccentrics      Step ups 2 10 6\"   Swissball wall rolls- in SLS- hip drive      Quad hip ext/wall-ball rolls      Gait training 5'           Manual Intervention (03538)  Min: 15'      Knee mobs/PROM/STM 15'     Tib/Fem Mobs      Patella Mobs      Ankle mobs                  NMR re-education (70441)  Min:10'   CUES NEEDED   Scottish/Biofeedback 10/10 10'     BFR      G. Med activation      Hip Ext full ROM/ G. Activation      Bosu Bal and Prop- G Med      Single leg stance/Balance/Prop      Bosu Retro G. Med act      Prone Hip froggers- sliders/elevated            Therapeutic Activity (61448)  Min:      Ladders      Plyos      Dynamic Balance                            Therapeutic Exercise and NMR EXR  [x] (51438) Provided verbal/tactile cueing for activities related to strengthening, flexibility, endurance, ROM for improvements in LE, proximal hip, and core control with self care, mobility, lifting, ambulation. [x] (36675) Provided verbal/tactile cueing for activities related to improving balance, coordination, kinesthetic sense, posture, motor skill, proprioception  to assist with LE, proximal hip, and core control in self care, mobility, lifting, ambulation and eccentric single leg control.      NMR and Therapeutic Activities:    [x] (07227 or 25348) Provided verbal/tactile cueing for activities related to improving balance, coordination, kinesthetic sense, posture, motor skill, proprioception and motor activation to allow for proper function of core, proximal hip and LE with self care and ADLs and functional mobility.   [] (15406) Gait Re-education- Provided training and instruction to the patient for proper LE, core and proximal hip recruitment and positioning and eccentric body weight control with ambulation re-education including up and down stairs     Home Exercise Program:    [x] (05705) Reviewed/Progressed HEP activities related to strengthening, flexibility, endurance, ROM of core, proximal hip and LE for functional self-care, mobility, lifting and ambulation/stair navigation   [] (07176)Reviewed/Progressed HEP activities related to improving balance, coordination, kinesthetic sense, posture, motor skill, proprioception of core, proximal hip and LE for self care, mobility, lifting, and ambulation/stair navigation      Manual Treatments:  PROM / STM / Oscillations-Mobs:  G-I, II, III, IV (PA's, Inf., Post.)  [x] (08059) Provided manual therapy to mobilize LE, proximal hip and/or LS spine soft tissue/joints for the purpose of modulating pain, promoting relaxation,  increasing ROM, reducing/eliminating soft tissue swelling/inflammation/restriction, improving soft tissue extensibility and allowing for proper ROM for normal function with self care, mobility, lifting and ambulation. Modalities:     [x] GAME READY (VASO)- for significant edema, swelling, pain control. - to L knee with patient in supine with LLE elevated on 2 pillows, moderate compression x 10 minutes    Charges:  Timed Code Treatment Minutes: 60   Total Treatment Minutes: 70      [] EVAL (LOW) 33753 (typically 20 minutes face-to-face)  [] EVAL (MOD) 46963 (typically 30 minutes face-to-face)  [] EVAL (HIGH) 95118 (typically 45 minutes face-to-face)  [] RE-EVAL     [x] XP(88086) x 2    [] IONTO (21981)  [x] NMR (25997) x1     [] VASO (45184)  [x] Manual (22849) x1     [] Other:  [] TA (37393)x     [] Mech Traction (60304)  [] ES(attended) (50961)     [] ES (un) (23869): If [facility] Please Indicate Time In/Out  CPT Code Time in Time out                                   GOALS:  Patient stated goal: pain-free, return to PLOF including unlimited walking/exercise  [] Progressing: [] Met: [] Not Met: [] Adjusted     Therapist goals for Patient:   Short Term Goals: To be achieved in: 2 weeks  1.  Independent in HEP and progression per patient tolerance, in order to prevent re-injury. [] Progressing: [] Met: [] Not Met: [] Adjusted  2. Patient will have a decrease in pain to facilitate improvement in movement, function, and ADLs as indicated by Functional Deficits. [] Progressing: [] Met: [] Not Met: [] Adjusted    Long Term Goals: To be achieved in: 8 weeks  1. Disability index score of 30% or less for the LEFS to assist with reaching prior level of function. [] Progressing: [] Met: [] Not Met: [] Adjusted  2. Patient will demonstrate increased AROM to 0-120 or greater to allow for proper joint functioning as indicated by patients Functional Deficits. [] Progressing: [] Met: [] Not Met: [] Adjusted  3. Patient will demonstrate an increase in Strength to good proximal hip strength and control, within 5lb HHD in LE to allow for proper functional mobility as indicated by patients Functional Deficits. [] Progressing: [] Met: [] Not Met: [] Adjusted  4. Patient will return to sitting, standing, transfers, ambulation, squatting, stairs, kneeling  functional activities without increased symptoms or restriction. [] Progressing: [] Met: [] Not Met: [] Adjusted    ASSESSMENT:  Patient seems to have made great gains with use of biofeedback stim for quad contraction. Return to Play: (if applicable)   []  Stage 1: Intro to Strength   []  Stage 2: Return to Run and Strength   []  Stage 3: Return to Jump and Strength   []  Stage 4: Dynamic Strength and Agility   []  Stage 5: Sport Specific Training     []  Ready to Return to Play, Meets All Above Stages   []  Not Ready for Return to Sports   Comments:            Treatment/Activity Tolerance:  [x] Patient tolerated treatment well [] Patient limited by fatique  [] Patient limited by pain  [] Patient limited by other medical complications  [] Other:     Overall Progression Towards Functional goals/ Treatment Progress Update:  [x] Patient is progressing as expected towards functional goals listed.     [] Progression is slowed due to complexities/Impairments listed. [] Progression has been slowed due to co-morbidities. [] Plan just implemented, too soon to assess goals progression <30days   [] Goals require adjustment due to lack of progress  [] Patient is not progressing as expected and requires additional follow up with physician  [] Other    Prognosis for POC: [x] Good [] Fair  [] Poor    Patient requires continued skilled intervention: [x] Yes  [] No        PLAN:   [x] Continue per plan of care [] Alter current plan (see comments)  [] Plan of care initiated [] Hold pending MD visit [] Discharge    Electronically signed by: Cecilia Glover PT    Note: If patient does not return for scheduled/recommended follow up visits, this note will serve as a discharge from care along with the most recent update on progress.

## 2020-03-16 ENCOUNTER — HOSPITAL ENCOUNTER (OUTPATIENT)
Dept: PHYSICAL THERAPY | Age: 80
Setting detail: THERAPIES SERIES
Discharge: HOME | End: 2020-03-16
Payer: MEDICARE

## 2020-03-16 PROCEDURE — 97112 NEUROMUSCULAR REEDUCATION: CPT

## 2020-03-16 PROCEDURE — 97110 THERAPEUTIC EXERCISES: CPT

## 2020-03-16 PROCEDURE — 97140 MANUAL THERAPY 1/> REGIONS: CPT

## 2020-03-16 NOTE — FLOWSHEET NOTE
[place] [address] [address] [place]  Phone: [phone] Fax: [phone]    Physical Therapy Treatment Note/ Progress Report:     Date:  3/16/2020    Patient Name:  Rajan Islas    :  [birth year]  MRN: [id number]  Restrictions/Precautions:    Medical/Treatment Diagnosis Information:  · Diagnosis: L knee OA s/p L TKA - 2/3/20  · Treatment Diagnosis: M17.11, T16.075  Insurance/Certification information:  PT Insurance Information: A[employer]na Medicare  Physician Information:  Referring Practitioner: Morales Contreras  Plan of care signed (Y/N):     Date of Patient follow up with Physician: 20     Progress Report: []  Yes  [x]  No     Date Range for reporting period:  Beginnin20  Ending:  NA    Progress report due (10 Rx/or 30 days whichever is less):      Recertification due (POC duration/ or 90 days whichever is less): 4/3/20     Visit # Insurance Allowable Auth Needed   12 Medical necessity []Yes   [x]No     Latex Allergy:  [x]NO      []YES  Preferred Language for Healthcare:   [x]English       []other:  Functional Scale: LEFS - 86% impairment   Date assessed:20    Pain level:  5/10     SUBJECTIVE:  Patient reports left knee is doing much better in the last few days. She feels that she is getting stronger through left quad and that she is gaining ROM with stairs at home. OBJECTIVE:.  Improved quad contraction noted and able to get through full revolution on bike bwd   Observation:    Test measurements:      RESTRICTIONS/PRECAUTIONS: Hx of L MICHAEL, R TKA    Exercises/Interventions:     Therapeutic Ex (13714)   Min: 35 Sets/sec Reps Notes/CUES   Retro Stepper/BIKE 5'     Calf stretch 30\" 3          QS Supine, towel under knee   Long sitting calf stretch towel   Long sitting HS stretch    Heel prop   towel   Sportcord     SLR/SAQ 2 10 With stim   SL hip ABD      Calf raise      Standing hip abd Each leg   BOSU in: 2 weeks  1. Independent in HEP and progression per patient tolerance, in order to prevent re-injury. [] Progressing: [] Met: [] Not Met: [] Adjusted  2. Patient will have a decrease in pain to facilitate improvement in movement, function, and ADLs as indicated by Functional Deficits. [] Progressing: [] Met: [] Not Met: [] Adjusted    Long Term Goals: To be achieved in: 8 weeks  1. Disability index score of 30% or less for the LEFS to assist with reaching prior level of function. [] Progressing: [] Met: [] Not Met: [] Adjusted  2. Patient will demonstrate increased AROM to 0-120 or greater to allow for proper joint functioning as indicated by patients Functional Deficits. [] Progressing: [] Met: [] Not Met: [] Adjusted  3. Patient will demonstrate an increase in Strength to good proximal hip strength and control, within 5lb HHD in LE to allow for proper functional mobility as indicated by patients Functional Deficits. [] Progressing: [] Met: [] Not Met: [] Adjusted  4. Patient will return to sitting, standing, transfers, ambulation, squatting, stairs, kneeling  functional activities without increased symptoms or restriction. [] Progressing: [] Met: [] Not Met: [] Adjusted    ASSESSMENT:  Patient is making gains with strength and ROM in the last few visits. She has some continued difficulty with stair ascension and descension.      Return to Play: (if applicable)   []  Stage 1: Intro to Strength   []  Stage 2: Return to Run and Strength   []  Stage 3: Return to Jump and Strength   []  Stage 4: Dynamic Strength and Agility   []  Stage 5: Sport Specific Training     []  Ready to Return to Play, Meets All Above Stages   []  Not Ready for Return to Sports   Comments:            Treatment/Activity Tolerance:  [x] Patient tolerated treatment well [] Patient limited by fatique  [] Patient limited by pain  [] Patient limited by other medical complications  [] Other:     Overall Progression Towards Functional goals/ Treatment Progress Update:  [x] Patient is progressing as expected towards functional goals listed. [] Progression is slowed due to complexities/Impairments listed. [] Progression has been slowed due to co-morbidities. [] Plan just implemented, too soon to assess goals progression <30days   [] Goals require adjustment due to lack of progress  [] Patient is not progressing as expected and requires additional follow up with physician  [] Other    Prognosis for POC: [x] Good [] Fair  [] Poor    Patient requires continued skilled intervention: [x] Yes  [] No        PLAN:   [x] Continue per plan of care [] Alter current plan (see comments)  [] Plan of care initiated [] Hold pending MD visit [] Discharge    Electronically signed by: Matt Cobian PT    Note: If patient does not return for scheduled/recommended follow up visits, this note will serve as a discharge from care along with the most recent update on progress.

## 2020-03-18 ENCOUNTER — HOSPITAL ENCOUNTER (OUTPATIENT)
Dept: PHYSICAL THERAPY | Age: 80
Setting detail: THERAPIES SERIES
Discharge: HOME | End: 2020-03-18
Payer: MEDICARE

## 2020-03-23 ENCOUNTER — HOSPITAL ENCOUNTER (OUTPATIENT)
Dept: PHYSICAL THERAPY | Age: 80
Setting detail: THERAPIES SERIES
End: 2020-03-23
Payer: MEDICARE

## 2020-03-25 ENCOUNTER — APPOINTMENT (OUTPATIENT)
Dept: PHYSICAL THERAPY | Age: 80
End: 2020-03-25
Payer: MEDICARE

## 2020-03-30 ENCOUNTER — APPOINTMENT (OUTPATIENT)
Dept: PHYSICAL THERAPY | Age: 80
End: 2020-03-30
Payer: MEDICARE

## 2020-04-01 ENCOUNTER — APPOINTMENT (OUTPATIENT)
Dept: PHYSICAL THERAPY | Age: 80
End: 2020-04-01
Payer: MEDICARE

## 2020-04-27 ENCOUNTER — HOSPITAL ENCOUNTER (OUTPATIENT)
Dept: PHYSICAL THERAPY | Age: 80
Setting detail: THERAPIES SERIES
Discharge: HOME | End: 2020-04-27
Payer: MEDICARE

## 2020-04-27 PROCEDURE — 97110 THERAPEUTIC EXERCISES: CPT

## 2020-04-27 PROCEDURE — 97140 MANUAL THERAPY 1/> REGIONS: CPT

## 2020-04-27 NOTE — FLOWSHEET NOTE
[place] [address] [address] [place]  Phone: [phone] Fax: [phone]    Physical Therapy Treatment Note/ Progress Report:     Date:  2020    Patient Name:  Mariel Lewis    :  [birth year]  MRN: [id number]  Restrictions/Precautions:    Medical/Treatment Diagnosis Information:  · Diagnosis: L knee OA s/p L TKA - 2/3/20  · Treatment Diagnosis: M17.11, X63.953  Insurance/Certification information:  PT Insurance Information: A[employer]na Medicare  Physician Information:  Referring Practitioner: Shelton Snyder  Plan of care signed (Y/N):     Date of Patient follow up with Physician: 20     Progress Report: []  Yes  [x]  No     Date Range for reporting period:  Beginnin20  Ending:  NA    Progress report due (10 Rx/or 30 days whichever is less): 47     Recertification due (POC duration/ or 90 days whichever is less): 4/3/20     Visit # Insurance Allowable Auth Needed   13 Medical necessity []Yes   [x]No     Latex Allergy:  [x]NO      []YES  Preferred Language for Healthcare:   [x]English       []other:  Functional Scale: LEFS - 86% impairment   Date assessed:20    Pain level:  5/10     SUBJECTIVE:  Patient states she was doing well initially with HEP however, noted some increased pain and catching in left knee during walking which began a few weeks ago and has not subsided. She is concerned that she has done something detrimental to the knee. OBJECTIVE:.  Improved quad contraction noted and able to get through full revolution on bike bwd   Observation:    Test measurements:  5-95* left knee    RESTRICTIONS/PRECAUTIONS: Hx of L MICHAEL, R TKA    Exercises/Interventions:     Therapeutic Ex (84432)   Min: 15 Sets/sec Reps Notes/CUES   Retro Stepper/BIKE 5'     Calf stretch          QS Supine, towel under knee   Long sitting calf stretch towel   Long sitting HS stretch    Heel prop   towel   Sportcord March    SLR/SAQ With stim   SL hip ABD      Calf raise      Standing hip abd Each leg   BOSU fwd/side lunge 5\"   BOSU squat      Leg Press  0- 2 10 70#   HS curl    TKE 2 20 With ball at wall on stim   Glute side walks orange   RDL      Slide Lunge 2 10    Slide HS eccentrics      Step ups 2 10 8\"   Swissball wall rolls- in SLS- hip drive      Quad hip ext/wall-ball rolls      Gait training 5'           Manual Intervention (84781)  Min: 25'      Knee mobs/PROM/STM 25'     Tib/Fem Mobs      Patella Mobs      Ankle mobs                   CUES NEEDED   Bermudian/Biofeedback 10/10     BFR      G. Med activation      Hip Ext full ROM/ G. Activation      Bosu Bal and Prop- G Med      Single leg stance/Balance/Prop      Bosu Retro G. Med act      Prone Hip froggers- sliders/elevated            Therapeutic Activity (91907)  Min:      Ladders      Plyos      Dynamic Balance                            Therapeutic Exercise and NMR EXR  [x] (99371) Provided verbal/tactile cueing for activities related to strengthening, flexibility, endurance, ROM for improvements in LE, proximal hip, and core control with self care, mobility, lifting, ambulation. [x] (41442) Provided verbal/tactile cueing for activities related to improving balance, coordination, kinesthetic sense, posture, motor skill, proprioception  to assist with LE, proximal hip, and core control in self care, mobility, lifting, ambulation and eccentric single leg control.      NMR and Therapeutic Activities:    [x] (89152 or 71705) Provided verbal/tactile cueing for activities related to improving balance, coordination, kinesthetic sense, posture, motor skill, proprioception and motor activation to allow for proper function of core, proximal hip and LE with self care and ADLs and functional mobility.   [] (18268) Gait Re-education- Provided training and instruction to the patient for proper LE, core and proximal hip recruitment and positioning and eccentric body weight control with ambulation re-education including up and down stairs     Home Exercise Program:    [x] (53821) Reviewed/Progressed HEP activities related to strengthening, flexibility, endurance, ROM of core, proximal hip and LE for functional self-care, mobility, lifting and ambulation/stair navigation   [] (62477)Reviewed/Progressed HEP activities related to improving balance, coordination, kinesthetic sense, posture, motor skill, proprioception of core, proximal hip and LE for self care, mobility, lifting, and ambulation/stair navigation      Manual Treatments:  PROM / STM / Oscillations-Mobs:  G-I, II, III, IV (PA's, Inf., Post.)  [x] (86297) Provided manual therapy to mobilize LE, proximal hip and/or LS spine soft tissue/joints for the purpose of modulating pain, promoting relaxation,  increasing ROM, reducing/eliminating soft tissue swelling/inflammation/restriction, improving soft tissue extensibility and allowing for proper ROM for normal function with self care, mobility, lifting and ambulation. Modalities:     [x] GAME READY (VASO)- for significant edema, swelling, pain control. - to L knee with patient in supine with LLE elevated on 2 pillows, moderate compression x 10 minutes    Charges:  Timed Code Treatment Minutes: 40   Total Treatment Minutes: 50      [] EVAL (LOW) 31972 (typically 20 minutes face-to-face)  [] EVAL (MOD) 00393 (typically 30 minutes face-to-face)  [] EVAL (HIGH) 83176 (typically 45 minutes face-to-face)  [] RE-EVAL     [x] EL(26618) x 1    [] IONTO (31554)  [] NMR (21213) x     [] VASO (66972)  [x] Manual (28954) x2     [] Other:  [] TA (85262)x     [] Mech Traction (23956)  [] ES(attended) (30309)     [] ES (un) (28939):      If [facility] Please Indicate Time In/Out  CPT Code Time in Time out                                   GOALS:  Patient stated goal: pain-free, return to PLOF including unlimited walking/exercise  [] Progressing: [] Met: [] Not Met: [] Adjusted     Therapist goals for Patient:   Short Term Patient limited by other medical complications  [] Other:     Overall Progression Towards Functional goals/ Treatment Progress Update:  [x] Patient is progressing as expected towards functional goals listed. [] Progression is slowed due to complexities/Impairments listed. [] Progression has been slowed due to co-morbidities. [] Plan just implemented, too soon to assess goals progression <30days   [] Goals require adjustment due to lack of progress  [] Patient is not progressing as expected and requires additional follow up with physician  [] Other    Prognosis for POC: [x] Good [] Fair  [] Poor    Patient requires continued skilled intervention: [x] Yes  [] No        PLAN: Return to formal therapy at 2X/week to decrease adhesions and promote further functional strength  [x] Continue per plan of care [] Alter current plan (see comments)  [] Plan of care initiated [] Hold pending MD visit [] Discharge    Electronically signed by: Rg Iglesias PT    Note: If patient does not return for scheduled/recommended follow up visits, this note will serve as a discharge from care along with the most recent update on progress.

## 2020-04-29 ENCOUNTER — HOSPITAL ENCOUNTER (OUTPATIENT)
Dept: PHYSICAL THERAPY | Age: 80
Setting detail: THERAPIES SERIES
Discharge: HOME | End: 2020-04-29
Payer: MEDICARE

## 2020-04-29 PROCEDURE — 97110 THERAPEUTIC EXERCISES: CPT

## 2020-04-29 PROCEDURE — 97140 MANUAL THERAPY 1/> REGIONS: CPT

## 2020-05-04 ENCOUNTER — HOSPITAL ENCOUNTER (OUTPATIENT)
Dept: PHYSICAL THERAPY | Age: 80
Setting detail: THERAPIES SERIES
Discharge: HOME | End: 2020-05-04
Payer: MEDICARE

## 2020-05-04 PROCEDURE — 97140 MANUAL THERAPY 1/> REGIONS: CPT

## 2020-05-04 PROCEDURE — 97110 THERAPEUTIC EXERCISES: CPT

## 2020-05-04 NOTE — PLAN OF CARE
[place] [address] [address] [place]  Phone: [phone] Fax: [phone]        Physical Therapy Re-Certification Plan of [facility]      Dear Dr. Faizan Sharpe ,    We had the pleasure of treating the following patient for physical therapy services at [address] [place] [address]. A summary of our findings can be found in the updated assessment below. This includes our plan of care. If you have any questions or concerns regarding these findings, please do not hesitate to contact me at the office phone number checked above.   Thank you for the referral.     Physician Signature:________________________________Date:__________________  By signing above (or electronic signature), therapists plan is approved by physician    Date Range Of Visits: 20-20  Total Visits to Date: 15  Overall Response to Treatment:   [x]Patient is responding well to treatment and improvement is noted with regards  to goals   []Patient should continue to improve in reasonable time if they continue HEP   []Patient has plateaued and is no longer responding to skilled PT intervention    []Patient is getting worse and would benefit from return to referring MD   []Patient unable to adhere to initial POC   []Other:     Date:  2020    Patient Name:  Riddhi Adkins    :  [birth year]  MRN: [id number]  Restrictions/Precautions:    Medical/Treatment Diagnosis Information:  · Diagnosis: L knee OA s/p L TKA - 2/3/20  · Treatment Diagnosis: M17.11, A09.032  Insurance/Certification information:  PT Insurance Information: Aetna Medicare  Physician Information:  Referring Practitioner: Sheryl Moser  Plan of care signed (Y/N):     Date of Patient follow up with Physician: 20     Progress Report: []  Yes  [x]  No     Date Range for reporting period:  Beginnin20  Ending:  NA    Progress report due (10 Rx/or 30 days whichever is less): 3/7/20 Inf., Post.)  [x] (20308) Provided manual therapy to mobilize LE, proximal hip and/or LS spine soft tissue/joints for the purpose of modulating pain, promoting relaxation,  increasing ROM, reducing/eliminating soft tissue swelling/inflammation/restriction, improving soft tissue extensibility and allowing for proper ROM for normal function with self care, mobility, lifting and ambulation. Modalities:     [x] GAME READY (VASO)- for significant edema, swelling, pain control. - to L knee with patient in supine with LLE elevated on 2 pillows, moderate compression x 10 minutes    Charges:  Timed Code Treatment Minutes: 55   Total Treatment Minutes: 65      [] EVAL (LOW) 01680 (typically 20 minutes face-to-face)  [] EVAL (MOD) 01417 (typically 30 minutes face-to-face)  [] EVAL (HIGH) 78561 (typically 45 minutes face-to-face)  [] RE-EVAL     [x] OD(61444) x 2    [] IONTO (08317)  [] NMR (52828) x     [] VASO (90390)  [x] Manual (72392) x2     [] Other:  [] TA (40914)x     [] Mech Traction (49150)  [] ES(attended) (04139)     [] ES (un) (03705): If [facility] Please Indicate Time In/Out  CPT Code Time in Time out                                   GOALS:  Patient stated goal: pain-free, return to PLOF including unlimited walking/exercise  [] Progressing: [] Met: [x] Not Met: [] Adjusted     Therapist goals for Patient:   Short Term Goals: To be achieved in: 2 weeks  1. Independent in HEP and progression per patient tolerance, in order to prevent re-injury. [] Progressing: [x] Met: [] Not Met: [] Adjusted  2. Patient will have a decrease in pain to facilitate improvement in movement, function, and ADLs as indicated by Functional Deficits. [] Progressing: [x] Met: [] Not Met: [] Adjusted    Long Term Goals: To be achieved in: 8 weeks  1. Disability index score of 30% or less for the LEFS to assist with reaching prior level of function. [] Progressing: [] Met: [x] Not Met: [] Adjusted (currently 48%)  2.  Patient will

## 2020-05-06 ENCOUNTER — HOSPITAL ENCOUNTER (OUTPATIENT)
Dept: PHYSICAL THERAPY | Age: 80
Setting detail: THERAPIES SERIES
Discharge: HOME | End: 2020-05-06
Payer: MEDICARE

## 2020-05-06 PROCEDURE — 97140 MANUAL THERAPY 1/> REGIONS: CPT

## 2020-05-06 PROCEDURE — 97110 THERAPEUTIC EXERCISES: CPT

## 2020-05-06 NOTE — FLOWSHEET NOTE
instruction to the patient for proper LE, core and proximal hip recruitment and positioning and eccentric body weight control with ambulation re-education including up and down stairs     Home Exercise Program:    [x] (26560) Reviewed/Progressed HEP activities related to strengthening, flexibility, endurance, ROM of core, proximal hip and LE for functional self-care, mobility, lifting and ambulation/stair navigation   [] (84780)Reviewed/Progressed HEP activities related to improving balance, coordination, kinesthetic sense, posture, motor skill, proprioception of core, proximal hip and LE for self care, mobility, lifting, and ambulation/stair navigation      Manual Treatments:  PROM / STM / Oscillations-Mobs:  G-I, II, III, IV (PA's, Inf., Post.)  [x] (22248) Provided manual therapy to mobilize LE, proximal hip and/or LS spine soft tissue/joints for the purpose of modulating pain, promoting relaxation,  increasing ROM, reducing/eliminating soft tissue swelling/inflammation/restriction, improving soft tissue extensibility and allowing for proper ROM for normal function with self care, mobility, lifting and ambulation. Modalities:     [x] GAME READY (VASO)- for significant edema, swelling, pain control. - to L knee with patient in supine with LLE elevated on 2 pillows, moderate compression x 10 minutes    Charges:  Timed Code Treatment Minutes: 45   Total Treatment Minutes: 55      [] EVAL (LOW) 87898 (typically 20 minutes face-to-face)  [] EVAL (MOD) 86830 (typically 30 minutes face-to-face)  [] EVAL (HIGH) 74270 (typically 45 minutes face-to-face)  [] RE-EVAL     [x] XT(80378) x 2    [] IONTO (33800)  [] NMR (15769) x     [] VASO (33582)  [x] Manual (60633) x1     [] Other:  [] TA (76290)x     [] Mech Traction (06677)  [] ES(attended) (72183)     [] ES (un) (82101):      If BWC Please Indicate Time In/Out  CPT Code Time in Time out                                   GOALS:  Patient stated goal: pain-free, return to PLOF including unlimited walking/exercise  [] Progressing: [] Met: [] Not Met: [] Adjusted     Therapist goals for Patient:   Short Term Goals: To be achieved in: 2 weeks  1. Independent in HEP and progression per patient tolerance, in order to prevent re-injury. [] Progressing: [] Met: [] Not Met: [] Adjusted  2. Patient will have a decrease in pain to facilitate improvement in movement, function, and ADLs as indicated by Functional Deficits. [] Progressing: [] Met: [] Not Met: [] Adjusted    Long Term Goals: To be achieved in: 8 weeks  1. Disability index score of 30% or less for the LEFS to assist with reaching prior level of function. [] Progressing: [] Met: [] Not Met: [] Adjusted  2. Patient will demonstrate increased AROM to 0-120 or greater to allow for proper joint functioning as indicated by patients Functional Deficits. [] Progressing: [] Met: [] Not Met: [] Adjusted  3. Patient will demonstrate an increase in Strength to good proximal hip strength and control, within 5lb HHD in LE to allow for proper functional mobility as indicated by patients Functional Deficits. [] Progressing: [] Met: [] Not Met: [] Adjusted  4. Patient will return to sitting, standing, transfers, ambulation, squatting, stairs, kneeling  functional activities without increased symptoms or restriction. [] Progressing: [] Met: [] Not Met: [] Adjusted    ASSESSMENT:  Patient has made great gains with therapy and is doing better with gait and function.     Return to Play: (if applicable)   []  Stage 1: Intro to Strength   []  Stage 2: Return to Run and Strength   []  Stage 3: Return to Jump and Strength   []  Stage 4: Dynamic Strength and Agility   []  Stage 5: Sport Specific Training     []  Ready to Return to Play, Meets All Above Stages   []  Not Ready for Return to Sports   Comments:            Treatment/Activity Tolerance:  [x] Patient tolerated treatment well [] Patient limited by fatique  [] Patient limited by

## 2020-05-11 ENCOUNTER — HOSPITAL ENCOUNTER (OUTPATIENT)
Dept: PHYSICAL THERAPY | Age: 80
Setting detail: THERAPIES SERIES
Discharge: HOME | End: 2020-05-11
Payer: MEDICARE

## 2020-05-11 PROCEDURE — 97110 THERAPEUTIC EXERCISES: CPT

## 2020-05-11 PROCEDURE — 97140 MANUAL THERAPY 1/> REGIONS: CPT

## 2020-05-11 NOTE — FLOWSHEET NOTE
[] (32302) Gait Re-education- Provided training and instruction to the patient for proper LE, core and proximal hip recruitment and positioning and eccentric body weight control with ambulation re-education including up and down stairs     Home Exercise Program:    [x] (59234) Reviewed/Progressed HEP activities related to strengthening, flexibility, endurance, ROM of core, proximal hip and LE for functional self-care, mobility, lifting and ambulation/stair navigation   [] (83148)Reviewed/Progressed HEP activities related to improving balance, coordination, kinesthetic sense, posture, motor skill, proprioception of core, proximal hip and LE for self care, mobility, lifting, and ambulation/stair navigation      Manual Treatments:  PROM / STM / Oscillations-Mobs:  G-I, II, III, IV (PA's, Inf., Post.)  [x] (81093) Provided manual therapy to mobilize LE, proximal hip and/or LS spine soft tissue/joints for the purpose of modulating pain, promoting relaxation,  increasing ROM, reducing/eliminating soft tissue swelling/inflammation/restriction, improving soft tissue extensibility and allowing for proper ROM for normal function with self care, mobility, lifting and ambulation. Modalities:     [x] GAME READY (VASO)- for significant edema, swelling, pain control. - to L knee with patient in supine with LLE elevated on 2 pillows, moderate compression x 10 minutes    Charges:  Timed Code Treatment Minutes: 55   Total Treatment Minutes: 65      [] EVAL (LOW) 30330 (typically 20 minutes face-to-face)  [] EVAL (MOD) 72874 (typically 30 minutes face-to-face)  [] EVAL (HIGH) 15596 (typically 45 minutes face-to-face)  [] RE-EVAL     [x] GQ(76572) x 3    [] IONTO (20841)  [] NMR (32145) x     [] VASO (16144)  [x] Manual (67618) x1     [] Other:  [] TA (11068)x     [] Mech Traction (54271)  [] ES(attended) (19507)     [] ES (un) (82988):      If BWC Please Indicate Time In/Out  CPT Code Time in Time out GOALS:  Patient stated goal: pain-free, return to PLOF including unlimited walking/exercise  [] Progressing: [] Met: [] Not Met: [] Adjusted     Therapist goals for Patient:   Short Term Goals: To be achieved in: 2 weeks  1. Independent in HEP and progression per patient tolerance, in order to prevent re-injury. [] Progressing: [] Met: [] Not Met: [] Adjusted  2. Patient will have a decrease in pain to facilitate improvement in movement, function, and ADLs as indicated by Functional Deficits. [] Progressing: [] Met: [] Not Met: [] Adjusted    Long Term Goals: To be achieved in: 8 weeks  1. Disability index score of 30% or less for the LEFS to assist with reaching prior level of function. [] Progressing: [] Met: [] Not Met: [] Adjusted  2. Patient will demonstrate increased AROM to 0-120 or greater to allow for proper joint functioning as indicated by patients Functional Deficits. [] Progressing: [] Met: [] Not Met: [] Adjusted  3. Patient will demonstrate an increase in Strength to good proximal hip strength and control, within 5lb HHD in LE to allow for proper functional mobility as indicated by patients Functional Deficits. [] Progressing: [] Met: [] Not Met: [] Adjusted  4. Patient will return to sitting, standing, transfers, ambulation, squatting, stairs, kneeling  functional activities without increased symptoms or restriction. [] Progressing: [] Met: [] Not Met: [] Adjusted    ASSESSMENT:  Patient has some remaining left proximal fibular head pain. She has some catching as she transitions from toe off to swing phase. Would benefit from further therapy to promote further strength.      Return to Play: (if applicable)   []  Stage 1: Intro to Strength   []  Stage 2: Return to Run and Strength   []  Stage 3: Return to Jump and Strength   []  Stage 4: Dynamic Strength and Agility   []  Stage 5: Sport Specific Training     []  Ready to Return to Play, Meets All Above Stages   []  Not Ready for Return

## 2020-05-13 ENCOUNTER — HOSPITAL ENCOUNTER (OUTPATIENT)
Dept: PHYSICAL THERAPY | Age: 80
Setting detail: THERAPIES SERIES
Discharge: HOME | End: 2020-05-13
Payer: MEDICARE

## 2020-05-13 PROCEDURE — 97110 THERAPEUTIC EXERCISES: CPT

## 2020-05-13 PROCEDURE — 97140 MANUAL THERAPY 1/> REGIONS: CPT

## 2020-05-13 NOTE — FLOWSHEET NOTE
[place] [address] [address] [place]  Phone: [phone] Fax: [phone]    Physical Therapy Treatment Note/ Progress Report:     Date:  2020    Patient Name:  Cullen Gutierrez    :  [birth year]  MRN: [id number]  Restrictions/Precautions:    Medical/Treatment Diagnosis Information:  · Diagnosis: L knee OA s/p L TKA - 2/3/20  · Treatment Diagnosis: M17.11, C39.753  Insurance/Certification information:  PT Insurance Information: [employer]  Physician Information:  Referring Practitioner: Maycol Hill  Plan of care signed (Y/N):     Date of Patient follow up with Physician: 20     Progress Report: []  Yes  [x]  No     Date Range for reporting period:  Beginnin20  Ending:  NA    Progress report due (10 Rx/or 30 days whichever is less): 26     Recertification due (POC duration/ or 90 days whichever is less): 4/3/20     Visit # Insurance Allowable Auth Needed   17 Medical necessity []Yes   [x]No     Latex Allergy:  [x]NO      []YES  Preferred Language for Healthcare:   [x]English       []other:  Functional Scale: LEFS - 86% impairment   Date assessed:20    Pain level:  3-4/10     SUBJECTIVE:  Patient states having less lateral lower leg pain. She notes that she is experiencing less \"catching\" with walking as well. OBJECTIVE:.       Observation:    Test measurements:  Improved step up form and strength improved   Tender through anterior tib    RESTRICTIONS/PRECAUTIONS: Hx of L MICHAEL, R TKA    Exercises/Interventions:     Therapeutic Ex (45659)   Min: 40 Sets/sec Reps Notes/CUES   Retro Stepper/BIKE 5'     Calf stretch 30\" 3 Incline w/ slight ER         QS Supine, towel under knee   Long sitting calf stretch towel   Long sitting HS stretch W/ slight ER; reviewed for HEP   Heel prop   towel   [employer] March    SLR/SAQ With stim   SL hip ABD      Calf raise      Standing hip abd Each leg   BOSU fwd/side lunge  Met: [] Not Met: [] Adjusted     Therapist goals for Patient:   Short Term Goals: To be achieved in: 2 weeks  1. Independent in HEP and progression per patient tolerance, in order to prevent re-injury. [] Progressing: [] Met: [] Not Met: [] Adjusted  2. Patient will have a decrease in pain to facilitate improvement in movement, function, and ADLs as indicated by Functional Deficits. [] Progressing: [] Met: [] Not Met: [] Adjusted    Long Term Goals: To be achieved in: 8 weeks  1. Disability index score of 30% or less for the LEFS to assist with reaching prior level of function. [] Progressing: [] Met: [] Not Met: [] Adjusted  2. Patient will demonstrate increased AROM to 0-120 or greater to allow for proper joint functioning as indicated by patients Functional Deficits. [] Progressing: [] Met: [] Not Met: [] Adjusted  3. Patient will demonstrate an increase in Strength to good proximal hip strength and control, within 5lb HHD in LE to allow for proper functional mobility as indicated by patients Functional Deficits. [] Progressing: [] Met: [] Not Met: [] Adjusted  4. Patient will return to sitting, standing, transfers, ambulation, squatting, stairs, kneeling  functional activities without increased symptoms or restriction. [] Progressing: [] Met: [] Not Met: [] Adjusted    ASSESSMENT:  Patient continues to have some fibular head discomfort however, is doing better overall.       Return to Play: (if applicable)   []  Stage 1: Intro to Strength   []  Stage 2: Return to Run and Strength   []  Stage 3: Return to Jump and Strength   []  Stage 4: Dynamic Strength and Agility   []  Stage 5: Sport Specific Training     []  Ready to Return to Play, Meets All Above Stages   []  Not Ready for Return to Sports   Comments:            Treatment/Activity Tolerance:  [x] Patient tolerated treatment well [] Patient limited by fatique  [] Patient limited by pain  [] Patient limited by other medical complications  [] Other: Overall Progression Towards Functional goals/ Treatment Progress Update:  [x] Patient is progressing as expected towards functional goals listed. [] Progression is slowed due to complexities/Impairments listed. [] Progression has been slowed due to co-morbidities. [] Plan just implemented, too soon to assess goals progression <30days   [] Goals require adjustment due to lack of progress  [] Patient is not progressing as expected and requires additional follow up with physician  [] Other    Prognosis for POC: [x] Good [] Fair  [] Poor    Patient requires continued skilled intervention: [x] Yes  [] No        PLAN:   [x] Continue per plan of care [] Alter current plan (see comments)  [] Plan of care initiated [] Hold pending MD visit [] Discharge    Electronically signed by: Radha Miles PT    Note: If patient does not return for scheduled/recommended follow up visits, this note will serve as a discharge from care along with the most recent update on progress.

## 2020-05-18 ENCOUNTER — HOSPITAL ENCOUNTER (OUTPATIENT)
Dept: PHYSICAL THERAPY | Age: 80
Setting detail: THERAPIES SERIES
Discharge: HOME | End: 2020-05-18
Payer: MEDICARE

## 2020-05-18 PROCEDURE — 97110 THERAPEUTIC EXERCISES: CPT

## 2020-05-18 PROCEDURE — 97140 MANUAL THERAPY 1/> REGIONS: CPT

## 2020-05-18 NOTE — FLOWSHEET NOTE
[place] [address] [address] [place]  Phone: [phone] Fax: [phone]    Physical Therapy Treatment Note/ Progress Report:     Date:  2020    Patient Name:  Carlin Lyman    :  [birth year]  MRN: [id number]  Restrictions/Precautions:    Medical/Treatment Diagnosis Information:  · Diagnosis: L knee OA s/p L TKA - 2/3/20  · Treatment Diagnosis: M17.11, U16.799  Insurance/Certification information:  PT Insurance Information: [employer] Medicare  Physician Information:  Referring Practitioner: Frankie Bee  Plan of care signed (Y/N):     Date of Patient follow up with Physician: 20     Progress Report: []  Yes  [x]  No     Date Range for reporting period:  Beginnin20  Ending:  NA    Progress report due (10 Rx/or 30 days whichever is less): 40     Recertification due (POC duration/ or 90 days whichever is less): 4/3/20     Visit # Insurance Allowable Auth Needed   18 Medical necessity []Yes   [x]No     Latex Allergy:  [x]NO      []YES  Preferred Language for Healthcare:   [x]English       []other:  Functional Scale: LEFS - 86% impairment   Date assessed:20    Pain level:  3-4/10     SUBJECTIVE:  Patient states left knee continues to improve. No new pain or issues over the weekend. OBJECTIVE:.       Observation:    Test measurements:  Improved step up form and strength improved   Tender through anterior 0-122* left knee    RESTRICTIONS/PRECAUTIONS: Hx of L MICHAEL, R TKA    Exercises/Interventions:     Therapeutic Ex (35647)   Min: 40 Sets/sec Reps Notes/CUES   Retro Stepper/BIKE 5'     Calf stretch 30\" 3 Incline w/ slight ER         QS Supine, towel under knee   Long sitting calf stretch towel   Long sitting HS stretch W/ slight ER; reviewed for HEP   Heel prop   towel   [employer] March    SLR/SAQ With stim   SL hip ABD      Calf raise      Standing hip abd Each leg   BOSU fwd/side lunge 1 20 5\"   BOSU squat positioning and eccentric body weight control with ambulation re-education including up and down stairs     Home Exercise Program:    [x] (41878) Reviewed/Progressed HEP activities related to strengthening, flexibility, endurance, ROM of core, proximal hip and LE for functional self-care, mobility, lifting and ambulation/stair navigation   [] (15347)Reviewed/Progressed HEP activities related to improving balance, coordination, kinesthetic sense, posture, motor skill, proprioception of core, proximal hip and LE for self care, mobility, lifting, and ambulation/stair navigation      Manual Treatments:  PROM / STM / Oscillations-Mobs:  G-I, II, III, IV (PA's, Inf., Post.)  [x] (75887) Provided manual therapy to mobilize LE, proximal hip and/or LS spine soft tissue/joints for the purpose of modulating pain, promoting relaxation,  increasing ROM, reducing/eliminating soft tissue swelling/inflammation/restriction, improving soft tissue extensibility and allowing for proper ROM for normal function with self care, mobility, lifting and ambulation. Modalities:     [x] GAME READY (VASO)- for significant edema, swelling, pain control. - to L knee with patient in supine with LLE elevated on 2 pillows, moderate compression x 10 minutes    Charges:  Timed Code Treatment Minutes: 60   Total Treatment Minutes: 70      [] EVAL (LOW) 60865 (typically 20 minutes face-to-face)  [] EVAL (MOD) 98337 (typically 30 minutes face-to-face)  [] EVAL (HIGH) 93030 (typically 45 minutes face-to-face)  [] RE-EVAL     [x] RD(80294) x 3    [] IONTO (21732)  [] NMR (40067) x     [] VASO (57525)  [x] Manual (44498) x1     [] Other:  [] TA (37049)x     [] Mech Traction (38022)  [] ES(attended) (08905)     [] ES (un) (21140):      If BWC Please Indicate Time In/Out  CPT Code Time in Time out                                   GOALS:  Patient stated goal: pain-free, return to PLOF including unlimited walking/exercise  [] Progressing: [] Met: [] Not Met:

## 2020-05-20 ENCOUNTER — HOSPITAL ENCOUNTER (OUTPATIENT)
Dept: PHYSICAL THERAPY | Age: 80
Setting detail: THERAPIES SERIES
Discharge: HOME | End: 2020-05-20
Payer: MEDICARE

## 2020-05-20 PROCEDURE — 97110 THERAPEUTIC EXERCISES: CPT

## 2020-05-20 PROCEDURE — 97140 MANUAL THERAPY 1/> REGIONS: CPT

## 2020-05-20 NOTE — FLOWSHEET NOTE
Met: [] Not Met: [] Adjusted     Therapist goals for Patient:   Short Term Goals: To be achieved in: 2 weeks  1. Independent in HEP and progression per patient tolerance, in order to prevent re-injury. [] Progressing: [] Met: [] Not Met: [] Adjusted  2. Patient will have a decrease in pain to facilitate improvement in movement, function, and ADLs as indicated by Functional Deficits. [] Progressing: [] Met: [] Not Met: [] Adjusted    Long Term Goals: To be achieved in: 8 weeks  1. Disability index score of 30% or less for the LEFS to assist with reaching prior level of function. [] Progressing: [] Met: [] Not Met: [] Adjusted  2. Patient will demonstrate increased AROM to 0-120 or greater to allow for proper joint functioning as indicated by patients Functional Deficits. [] Progressing: [] Met: [] Not Met: [] Adjusted  3. Patient will demonstrate an increase in Strength to good proximal hip strength and control, within 5lb HHD in LE to allow for proper functional mobility as indicated by patients Functional Deficits. [] Progressing: [] Met: [] Not Met: [] Adjusted  4. Patient will return to sitting, standing, transfers, ambulation, squatting, stairs, kneeling  functional activities without increased symptoms or restriction. [] Progressing: [] Met: [] Not Met: [] Adjusted    ASSESSMENT:  Patient continues to make gains with therapy to date.      Return to Play: (if applicable)   []  Stage 1: Intro to Strength   []  Stage 2: Return to Run and Strength   []  Stage 3: Return to Jump and Strength   []  Stage 4: Dynamic Strength and Agility   []  Stage 5: Sport Specific Training     []  Ready to Return to Play, Meets All Above Stages   []  Not Ready for Return to Sports   Comments:            Treatment/Activity Tolerance:  [x] Patient tolerated treatment well [] Patient limited by fatique  [] Patient limited by pain  [] Patient limited by other medical complications  [] Other:     Overall Progression Towards

## 2020-05-26 ENCOUNTER — HOSPITAL ENCOUNTER (OUTPATIENT)
Dept: PHYSICAL THERAPY | Age: 80
Setting detail: THERAPIES SERIES
Discharge: HOME | End: 2020-05-26
Payer: MEDICARE

## 2020-05-26 PROCEDURE — 97140 MANUAL THERAPY 1/> REGIONS: CPT

## 2020-05-26 PROCEDURE — 97110 THERAPEUTIC EXERCISES: CPT

## 2020-05-26 NOTE — FLOWSHEET NOTE
[place] [address] [address]  Phone: [phone] Fax: [phone]    Physical Therapy Treatment Note/ Progress Report:     Date:  2020    Patient Name:  Joceline Aguilar    :  [birth year]  MRN: [id number]  Restrictions/Precautions:    Medical/Treatment Diagnosis Information:  · Diagnosis: L knee OA s/p L TKA - 2/3/20  · Treatment Diagnosis: M17.11, J50.794  Insurance/Certification information:  PT Insurance Information: [employer] Medicare  Physician Information:  Referring Practitioner: Jordin Salas  Plan of care signed (Y/N):     Date of Patient follow up with Physician: 20     Progress Report: []  Yes  [x]  No     Date Range for reporting period:  Beginnin20  Ending:  NA    Progress report due (10 Rx/or 30 days whichever is less): 97     Recertification due (POC duration/ or 90 days whichever is less): 4/3/20     Visit # Insurance Allowable Auth Needed   20 Medical necessity []Yes   [x]No     Latex Allergy:  [x]NO      []YES  Preferred Language for Healthcare:   [x]English       []other:  Functional Scale: LEFS - 86% impairment   Date assessed:20    Pain level:  3-4/10     SUBJECTIVE:  Patient reports she was able to clean windows, clean house and work in the yard over the weekend with very little pain. OBJECTIVE:.       Observation:    Test measurements: noted decreased \"catching\" with initiation of flexion, motion significantly improved       RESTRICTIONS/PRECAUTIONS: Hx of L MICHAEL, R TKA    Exercises/Interventions:     Therapeutic Ex (41425)   Min: 35 Sets/sec Reps Notes/CUES   Retro Stepper/BIKE 5'     Calf stretch 30\" 3 Incline w/ slight ER         QS Supine, towel under knee   Long sitting calf stretch towel   Long sitting HS stretch W/ slight ER; reviewed for HEP   Heel prop   towel   Sportcord     SLR/SAQ With stim   SL hip ABD      Calf raise      Standing hip abd Each leg   BOSU proximal hip recruitment and positioning and eccentric body weight control with ambulation re-education including up and down stairs     Home Exercise Program:    [x] (59838) Reviewed/Progressed HEP activities related to strengthening, flexibility, endurance, ROM of core, proximal hip and LE for functional self-care, mobility, lifting and ambulation/stair navigation   [] (28079)Reviewed/Progressed HEP activities related to improving balance, coordination, kinesthetic sense, posture, motor skill, proprioception of core, proximal hip and LE for self care, mobility, lifting, and ambulation/stair navigation      Manual Treatments:  PROM / STM / Oscillations-Mobs:  G-I, II, III, IV (PA's, Inf., Post.)  [x] (62370) Provided manual therapy to mobilize LE, proximal hip and/or LS spine soft tissue/joints for the purpose of modulating pain, promoting relaxation,  increasing ROM, reducing/eliminating soft tissue swelling/inflammation/restriction, improving soft tissue extensibility and allowing for proper ROM for normal function with self care, mobility, lifting and ambulation. Modalities:     [x] GAME READY (VASO)- for significant edema, swelling, pain control. - to L knee with patient in supine with LLE elevated on 2 pillows, moderate compression x 10 minutes    Charges:  Timed Code Treatment Minutes: 50   Total Treatment Minutes: 60      [] EVAL (LOW) 95598 (typically 20 minutes face-to-face)  [] EVAL (MOD) 61421 (typically 30 minutes face-to-face)  [] EVAL (HIGH) 82086 (typically 45 minutes face-to-face)  [] RE-EVAL     [x] BT(59140) x 2    [] IONTO (71080)  [] NMR (12577) x     [] VASO (39299)  [x] Manual (65700) x1     [] Other:  [] TA (84185)x     [] Mech Traction (31530)  [] ES(attended) (35466)     [] ES (un) (50090):      If BWC Please Indicate Time In/Out  CPT Code Time in Time out                                   GOALS:  Patient stated goal: pain-free, return to PLOF including unlimited walking/exercise  [] Progressing: [] Met: [] Not Met: [] Adjusted     Therapist goals for Patient:   Short Term Goals: To be achieved in: 2 weeks  1. Independent in HEP and progression per patient tolerance, in order to prevent re-injury. [] Progressing: [] Met: [] Not Met: [] Adjusted  2. Patient will have a decrease in pain to facilitate improvement in movement, function, and ADLs as indicated by Functional Deficits. [] Progressing: [] Met: [] Not Met: [] Adjusted    Long Term Goals: To be achieved in: 8 weeks  1. Disability index score of 30% or less for the LEFS to assist with reaching prior level of function. [] Progressing: [] Met: [] Not Met: [] Adjusted  2. Patient will demonstrate increased AROM to 0-120 or greater to allow for proper joint functioning as indicated by patients Functional Deficits. [] Progressing: [] Met: [] Not Met: [] Adjusted  3. Patient will demonstrate an increase in Strength to good proximal hip strength and control, within 5lb HHD in LE to allow for proper functional mobility as indicated by patients Functional Deficits. [] Progressing: [] Met: [] Not Met: [] Adjusted  4. Patient will return to sitting, standing, transfers, ambulation, squatting, stairs, kneeling  functional activities without increased symptoms or restriction. [] Progressing: [] Met: [] Not Met: [] Adjusted    ASSESSMENT:  Patient is making great gains with therapy to date. She is making great functional gains.      Return to Play: (if applicable)   []  Stage 1: Intro to Strength   []  Stage 2: Return to Run and Strength   []  Stage 3: Return to Jump and Strength   []  Stage 4: Dynamic Strength and Agility   []  Stage 5: Sport Specific Training     []  Ready to Return to Play, Meets All Above Stages   []  Not Ready for Return to Sports   Comments:            Treatment/Activity Tolerance:  [x] Patient tolerated treatment well [] Patient limited by fatique  [] Patient limited by pain  [] Patient limited by other medical

## 2020-05-29 ENCOUNTER — HOSPITAL ENCOUNTER (OUTPATIENT)
Dept: PHYSICAL THERAPY | Age: 80
Setting detail: THERAPIES SERIES
Discharge: HOME | End: 2020-05-29
Payer: MEDICARE

## 2020-05-29 PROCEDURE — 97110 THERAPEUTIC EXERCISES: CPT

## 2020-05-29 PROCEDURE — 97140 MANUAL THERAPY 1/> REGIONS: CPT

## 2020-05-29 NOTE — FLOWSHEET NOTE
proper LE, core and proximal hip recruitment and positioning and eccentric body weight control with ambulation re-education including up and down stairs     Home Exercise Program:    [x] (97527) Reviewed/Progressed HEP activities related to strengthening, flexibility, endurance, ROM of core, proximal hip and LE for functional self-care, mobility, lifting and ambulation/stair navigation   [] (10888)Reviewed/Progressed HEP activities related to improving balance, coordination, kinesthetic sense, posture, motor skill, proprioception of core, proximal hip and LE for self care, mobility, lifting, and ambulation/stair navigation      Manual Treatments:  PROM / STM / Oscillations-Mobs:  G-I, II, III, IV (PA's, Inf., Post.)  [x] (48910) Provided manual therapy to mobilize LE, proximal hip and/or LS spine soft tissue/joints for the purpose of modulating pain, promoting relaxation,  increasing ROM, reducing/eliminating soft tissue swelling/inflammation/restriction, improving soft tissue extensibility and allowing for proper ROM for normal function with self care, mobility, lifting and ambulation. Modalities:     [x] GAME READY (VASO)- for significant edema, swelling, pain control. - to L knee with patient in supine with LLE elevated on 2 pillows, moderate compression x 10 minutes    Charges:  Timed Code Treatment Minutes: 50   Total Treatment Minutes: 60      [] EVAL (LOW) 89472 (typically 20 minutes face-to-face)  [] EVAL (MOD) 15927 (typically 30 minutes face-to-face)  [] EVAL (HIGH) 85768 (typically 45 minutes face-to-face)  [] RE-EVAL     [x] ZM(84494) x 2    [] IONTO (04915)  [] NMR (53451) x     [] VASO (95235)  [x] Manual (22947) x1     [] Other:  [] TA (14096)x     [] Mech Traction (04595)  [] ES(attended) (26689)     [] ES (un) (92730):      If BWC Please Indicate Time In/Out  CPT Code Time in Time out                                   GOALS:  Patient stated goal: pain-free, return to PLOF including unlimited

## 2020-06-01 ENCOUNTER — HOSPITAL ENCOUNTER (OUTPATIENT)
Dept: PHYSICAL THERAPY | Age: 80
Setting detail: THERAPIES SERIES
Discharge: HOME | End: 2020-06-01
Payer: MEDICARE

## 2020-06-01 PROCEDURE — 97110 THERAPEUTIC EXERCISES: CPT

## 2020-06-01 PROCEDURE — 97140 MANUAL THERAPY 1/> REGIONS: CPT

## 2020-06-01 NOTE — FLOWSHEET NOTE
[place] [address] [address]  Phone: [phone] Fax: [phone]    Physical Therapy Treatment Note/ Progress Report:     Date:  2020    Patient Name:  Juani Siegel    :  2081  MRN: [id number]  Restrictions/Precautions:    Medical/Treatment Diagnosis Information:  · Diagnosis: L knee OA s/p L TKA - 2/3/20  · Treatment Diagnosis: M17.11, R61.275  Insurance/Certification information:  PT Insurance Information: [employer]na Medicare  Physician Information:  Referring Practitioner: Angie Parker  Plan of care signed (Y/N):     Date of Patient follow up with Physician: 20     Progress Report: []  Yes  [x]  No     Date Range for reporting period:  Beginnin20  Ending:  NA    Progress report due (10 Rx/or 30 days whichever is less): 80     Recertification due (POC duration/ or 90 days whichever is less): 4/3/20     Visit # Insurance Allowable Auth Needed   22 Medical necessity []Yes   [x]No     Latex Allergy:  [x]NO      []YES  Preferred Language for Healthcare:   [x]English       []other:  Functional Scale: LEFS - 86% impairment   Date assessed:20    Pain level:  3-4/10     SUBJECTIVE:  Patient reports she was able to do more work at home in the yard which she has been limited in until now. OBJECTIVE:.       Observation: pain with seated flexion on HC machine   Test measurements:        RESTRICTIONS/PRECAUTIONS: Hx of L MICHAEL, R TKA    Exercises/Interventions:     Therapeutic Ex (38848)   Min: 35 Sets/sec Reps Notes/CUES   Retro Stepper/BIKE 5'     Calf stretch 30\" 3 Incline w/ slight ER         QS Supine, towel under knee   Long sitting calf stretch towel   Long sitting HS stretch W/ slight ER; reviewed for HEP   Heel prop   towel   Sportcord     SLR/SAQ With stim   SL hip ABD      Calf raise      Standing hip abd Each leg   BOSU fwd/side lunge 1 20 5\"   BOSU squat      Leg Press  0- 2 10 90# control with ambulation re-education including up and down stairs     Home Exercise Program:    [x] (48782) Reviewed/Progressed HEP activities related to strengthening, flexibility, endurance, ROM of core, proximal hip and LE for functional self-care, mobility, lifting and ambulation/stair navigation   [] (95083)Reviewed/Progressed HEP activities related to improving balance, coordination, kinesthetic sense, posture, motor skill, proprioception of core, proximal hip and LE for self care, mobility, lifting, and ambulation/stair navigation      Manual Treatments:  PROM / STM / Oscillations-Mobs:  G-I, II, III, IV (PA's, Inf., Post.)  [x] (33893) Provided manual therapy to mobilize LE, proximal hip and/or LS spine soft tissue/joints for the purpose of modulating pain, promoting relaxation,  increasing ROM, reducing/eliminating soft tissue swelling/inflammation/restriction, improving soft tissue extensibility and allowing for proper ROM for normal function with self care, mobility, lifting and ambulation. Modalities:     [x] GAME READY (VASO)- for significant edema, swelling, pain control. - to L knee with patient in supine with LLE elevated on 2 pillows, moderate compression x 10 minutes    Charges:  Timed Code Treatment Minutes: 50   Total Treatment Minutes: 60      [] EVAL (LOW) 39981 (typically 20 minutes face-to-face)  [] EVAL (MOD) 99708 (typically 30 minutes face-to-face)  [] EVAL (HIGH) 23648 (typically 45 minutes face-to-face)  [] RE-EVAL     [x] ART(12485) x 2    [] IONTO (59338)  [] NMR (99726) x     [] VASO (84022)  [x] Manual (76550) x1     [] Other:  [] TA (17998)x     [] Mech Traction (85163)  [] ES(attended) (12915)     [] ES (un) (89708):      If BW Please Indicate Time In/Out  CPT Code Time in Time out                                   GOALS:  Patient stated goal: pain-free, return to PLOF including unlimited walking/exercise  [] Progressing: [] Met: [] Not Met: [] Adjusted     Therapist goals for Patient:   Short Term Goals: To be achieved in: 2 weeks  1. Independent in HEP and progression per patient tolerance, in order to prevent re-injury. [] Progressing: [] Met: [] Not Met: [] Adjusted  2. Patient will have a decrease in pain to facilitate improvement in movement, function, and ADLs as indicated by Functional Deficits. [] Progressing: [] Met: [] Not Met: [] Adjusted    Long Term Goals: To be achieved in: 8 weeks  1. Disability index score of 30% or less for the LEFS to assist with reaching prior level of function. [] Progressing: [] Met: [] Not Met: [] Adjusted  2. Patient will demonstrate increased AROM to 0-120 or greater to allow for proper joint functioning as indicated by patients Functional Deficits. [] Progressing: [] Met: [] Not Met: [] Adjusted  3. Patient will demonstrate an increase in Strength to good proximal hip strength and control, within 5lb HHD in LE to allow for proper functional mobility as indicated by patients Functional Deficits. [] Progressing: [] Met: [] Not Met: [] Adjusted  4. Patient will return to sitting, standing, transfers, ambulation, squatting, stairs, kneeling  functional activities without increased symptoms or restriction. [] Progressing: [] Met: [] Not Met: [] Adjusted    ASSESSMENT:  Patient continues to make gains with therapy despite pain with distal hamstring during exercises.      Return to Play: (if applicable)   []  Stage 1: Intro to Strength   []  Stage 2: Return to Run and Strength   []  Stage 3: Return to Jump and Strength   []  Stage 4: Dynamic Strength and Agility   []  Stage 5: Sport Specific Training     []  Ready to Return to Play, Meets All Above Stages   []  Not Ready for Return to Sports   Comments:            Treatment/Activity Tolerance:  [x] Patient tolerated treatment well [] Patient limited by fatique  [] Patient limited by pain  [] Patient limited by other medical complications  [] Other:     Overall Progression Towards Functional

## 2020-06-03 ENCOUNTER — HOSPITAL ENCOUNTER (OUTPATIENT)
Dept: PHYSICAL THERAPY | Age: 80
Setting detail: THERAPIES SERIES
Discharge: HOME | End: 2020-06-03
Payer: MEDICARE

## 2020-06-03 PROCEDURE — 97110 THERAPEUTIC EXERCISES: CPT

## 2020-06-03 PROCEDURE — 97140 MANUAL THERAPY 1/> REGIONS: CPT

## 2020-06-03 NOTE — FLOWSHEET NOTE
complications  [] Other:     Overall Progression Towards Functional goals/ Treatment Progress Update:  [x] Patient is progressing as expected towards functional goals listed. [] Progression is slowed due to complexities/Impairments listed. [] Progression has been slowed due to co-morbidities. [] Plan just implemented, too soon to assess goals progression <30days   [] Goals require adjustment due to lack of progress  [] Patient is not progressing as expected and requires additional follow up with physician  [] Other    Prognosis for POC: [x] Good [] Fair  [] Poor    Patient requires continued skilled intervention: [x] Yes  [] No        PLAN:   [x] Continue per plan of care [] Alter current plan (see comments)  [] Plan of care initiated [] Hold pending MD visit [] Discharge    Electronically signed by: Junito Schumacher PT    Note: If patient does not return for scheduled/recommended follow up visits, this note will serve as a discharge from care along with the most recent update on progress.

## 2020-06-10 ENCOUNTER — HOSPITAL ENCOUNTER (OUTPATIENT)
Dept: PHYSICAL THERAPY | Age: 80
Setting detail: THERAPIES SERIES
Discharge: HOME | End: 2020-06-10
Payer: MEDICARE

## 2020-06-10 PROCEDURE — 97140 MANUAL THERAPY 1/> REGIONS: CPT

## 2020-06-10 PROCEDURE — 97110 THERAPEUTIC EXERCISES: CPT

## 2020-06-10 NOTE — PLAN OF CARE
Patient is aware of message below.    ----- Message from PARVEEN Garcia sent at 4/11/2018  4:11 PM CDT -----  Positive for BV again.  Flagyl 500mg twice daily twice daily x 7 days.  No alcohol while taking this medication. I will give 1 refill for her to use if infection comes back after completing.     Hip Ext full ROM/ G. Activation      Bosu Bal and Prop- G Med      Single leg stance/Balance/Prop      Bosu Retro G. Med act      Prone Hip froggers- sliders/elevated            Therapeutic Activity (10217)  Min:      Ladders      Plyos      Dynamic Balance                            Therapeutic Exercise and NMR EXR  [x] (26051) Provided verbal/tactile cueing for activities related to strengthening, flexibility, endurance, ROM for improvements in LE, proximal hip, and core control with self care, mobility, lifting, ambulation. [x] (57489) Provided verbal/tactile cueing for activities related to improving balance, coordination, kinesthetic sense, posture, motor skill, proprioception  to assist with LE, proximal hip, and core control in self care, mobility, lifting, ambulation and eccentric single leg control.      NMR and Therapeutic Activities:    [x] (91197 or 43475) Provided verbal/tactile cueing for activities related to improving balance, coordination, kinesthetic sense, posture, motor skill, proprioception and motor activation to allow for proper function of core, proximal hip and LE with self care and ADLs and functional mobility.   [] (61760) Gait Re-education- Provided training and instruction to the patient for proper LE, core and proximal hip recruitment and positioning and eccentric body weight control with ambulation re-education including up and down stairs     Home Exercise Program:    [x] (81087) Reviewed/Progressed HEP activities related to strengthening, flexibility, endurance, ROM of core, proximal hip and LE for functional self-care, mobility, lifting and ambulation/stair navigation   [] (96215)Reviewed/Progressed HEP activities related to improving balance, coordination, kinesthetic sense, posture, motor skill, proprioception of core, proximal hip and LE for self care, mobility, lifting, and ambulation/stair navigation      Manual Treatments:  PROM / STM / Oscillations-Mobs:  G-I, II, III, IV (PA's, Inf., Post.)  [x] (93114) Provided manual therapy to mobilize LE, proximal hip and/or LS spine soft tissue/joints for the purpose of modulating pain, promoting relaxation,  increasing ROM, reducing/eliminating soft tissue swelling/inflammation/restriction, improving soft tissue extensibility and allowing for proper ROM for normal function with self care, mobility, lifting and ambulation. Modalities:     [] GAME READY (VASO)- for significant edema, swelling, pain control. - to L knee with patient in supine with LLE elevated on 2 pillows, moderate compression x 10 minutes    Charges:  Timed Code Treatment Minutes: 50   Total Treatment Minutes: 50      [] EVAL (LOW) 08948 (typically 20 minutes face-to-face)  [] EVAL (MOD) 63755 (typically 30 minutes face-to-face)  [] EVAL (HIGH) 24415 (typically 45 minutes face-to-face)  [] RE-EVAL     [x] II(21337) x 2    [] IONTO (62389)  [] NMR (87693) x     [] VASO (31987)  [x] Manual (02879) x1     [] Other:  [] TA (62456)x     [] Mech Traction (74667)  [] ES(attended) (76855)     [] ES (un) (80802): If BW Please Indicate Time In/Out  CPT Code Time in Time out                                   GOALS:  Patient stated goal: pain-free, return to PLOF including unlimited walking/exercise  [x] Progressing: [] Met: [] Not Met: [] Adjusted     Therapist goals for Patient:   Short Term Goals: To be achieved in: 2 weeks  1. Independent in HEP and progression per patient tolerance, in order to prevent re-injury. [] Progressing: [x] Met: [] Not Met: [] Adjusted  2. Patient will have a decrease in pain to facilitate improvement in movement, function, and ADLs as indicated by Functional Deficits. [] Progressing: [x] Met: [] Not Met: [] Adjusted    Long Term Goals: To be achieved in: 8 weeks  1. Disability index score of 30% or less for the LEFS to assist with reaching prior level of function. [] Progressing: [] Met: [x] Not Met: [] Adjusted  2.  Patient will demonstrate

## 2020-06-17 ENCOUNTER — HOSPITAL ENCOUNTER (OUTPATIENT)
Dept: PHYSICAL THERAPY | Age: 80
Setting detail: THERAPIES SERIES
Discharge: HOME | End: 2020-06-17
Payer: MEDICARE

## 2020-06-17 PROCEDURE — 97110 THERAPEUTIC EXERCISES: CPT

## 2020-06-17 PROCEDURE — 97140 MANUAL THERAPY 1/> REGIONS: CPT

## 2020-06-17 NOTE — FLOWSHEET NOTE
[place] [address] [address] [place]  Phone: [phone] Fax: [phone]    Physical Therapy Treatment Note/ Progress Report:     Date:  2020    Patient Name:  James Crews    :  [birth year]  MRN: [id number]  Restrictions/Precautions:    Medical/Treatment Diagnosis Information:  · Diagnosis: L knee OA s/p L TKA - 2/3/20  · Treatment Diagnosis: M17.11, J11.250  Insurance/Certification information:  PT Insurance Information: Aetna Medicare  Physician Information:  Referring Practitioner: Del Peguero  Plan of care signed (Y/N):     Date of Patient follow up with Physician: 20     Progress Report: []  Yes  [x]  No     Date Range for reporting period:  Beginnin20  Ending:  NA    Progress report due (10 Rx/or 30 days whichever is less): 3/0/70     Recertification due (POC duration/ or 90 days whichever is less): 4/3/20     Visit # Insurance Allowable Auth Needed   25 Medical necessity []Yes   [x]No     Latex Allergy:  [x]NO      []YES  Preferred Language for Healthcare:   [x]English       []other:  Functional Scale: LEFS - 86% impairment   Date assessed:20    Pain level:  3-4/10     SUBJECTIVE:  Patient reports no real increase in pain. Was able to see Dr. Josiah Aranda who thought the knee looked great. OBJECTIVE:.     Observation: tender through posterior medial knee, improved motion noted   Test measurements:        RESTRICTIONS/PRECAUTIONS: Hx of L MICHAEL, R TKA    Exercises/Interventions:     Therapeutic Ex (09904)   Min: 35 Sets/sec Reps Notes/CUES   Retro Stepper/BIKE 5'     Calf stretch 30\" 3 Incline w/ slight ER         QS Supine, towel under knee   Long sitting calf stretch towel   Long sitting HS stretch W/ slight ER; reviewed for HEP   Heel prop   towel   Sportcord     SLR/SAQ With stim   SL hip ABD      Calf raise      Standing hip abd Each leg   BOSU fwd/side lunge  5\"   BOSU squat Leg Press  0- 2 10 90#   HS curl 20#   TKE With ball at wall on stim   Glute side walks 15' 2 orange   RDL 2 10 10#   Slide Lunge 2 10    Slide HS eccentrics      Step ups 2 10 8\"; cues for anterior weight shift    Swissball wall rolls- in SLS- hip drive      Quad hip ext/wall-ball rolls      Gait training      SLS on airex 15\" 3                      Manual Intervention (11789)  Min: 15'      Knee mobs/PROM/STM 15'  Distal medial HS/proximal medial gastroc on stretch; pin & stretch HS in supine   Tib/Fem Mobs      Patella Mobs      Ankle mobs                   CUES NEEDED   [place]/Biofeedback 10/10     BFR      G. Med activation      Hip Ext full ROM/ G. Activation      Bosu Bal and Prop- G Med      Single leg stance/Balance/Prop      Bosu Retro G. Med act      Prone Hip froggers- sliders/elevated            Therapeutic Activity (77178)  Min:      Ladders      Plyos      Dynamic Balance                            Therapeutic Exercise and NMR EXR  [x] (83402) Provided verbal/tactile cueing for activities related to strengthening, flexibility, endurance, ROM for improvements in LE, proximal hip, and core control with self care, mobility, lifting, ambulation. [x] (25592) Provided verbal/tactile cueing for activities related to improving balance, coordination, kinesthetic sense, posture, motor skill, proprioception  to assist with LE, proximal hip, and core control in self care, mobility, lifting, ambulation and eccentric single leg control.      NMR and Therapeutic Activities:    [x] (21205 or 72160) Provided verbal/tactile cueing for activities related to improving balance, coordination, kinesthetic sense, posture, motor skill, proprioception and motor activation to allow for proper function of core, proximal hip and LE with self care and ADLs and functional mobility.   [] (06254) Gait Re-education- Provided training and instruction to the patient for proper LE, core and proximal hip recruitment and positioning and eccentric body weight control with ambulation re-education including up and down stairs     Home Exercise Program:    [x] (40349) Reviewed/Progressed HEP activities related to strengthening, flexibility, endurance, ROM of core, proximal hip and LE for functional self-care, mobility, lifting and ambulation/stair navigation   [] (81749)Reviewed/Progressed HEP activities related to improving balance, coordination, kinesthetic sense, posture, motor skill, proprioception of core, proximal hip and LE for self care, mobility, lifting, and ambulation/stair navigation      Manual Treatments:  PROM / STM / Oscillations-Mobs:  G-I, II, III, IV (PA's, Inf., Post.)  [x] (70776) Provided manual therapy to mobilize LE, proximal hip and/or LS spine soft tissue/joints for the purpose of modulating pain, promoting relaxation,  increasing ROM, reducing/eliminating soft tissue swelling/inflammation/restriction, improving soft tissue extensibility and allowing for proper ROM for normal function with self care, mobility, lifting and ambulation. Modalities:     [] GAME READY (VASO)- for significant edema, swelling, pain control. - to L knee with patient in supine with LLE elevated on 2 pillows, moderate compression x 10 minutes    Charges:  Timed Code Treatment Minutes: 50   Total Treatment Minutes: 50      [] EVAL (LOW) 82738 (typically 20 minutes face-to-face)  [] EVAL (MOD) 63331 (typically 30 minutes face-to-face)  [] EVAL (HIGH) 65528 (typically 45 minutes face-to-face)  [] RE-EVAL     [x] WV(78658) x 2    [] IONTO (73682)  [] NMR (18043) x     [] VASO (90091)  [x] Manual (16883) x1     [] Other:  [] TA (84136)x     [] Mech Traction (83508)  [] ES(attended) (00436)     [] ES (un) (58093):      If BWC Please Indicate Time In/Out  CPT Code Time in Time out                                   GOALS:  Patient stated goal: pain-free, return to PLOF including unlimited walking/exercise  [] Progressing: [] Met: [] Not Met: [] Adjusted Towards Functional goals/ Treatment Progress Update:  [x] Patient is progressing as expected towards functional goals listed. [] Progression is slowed due to complexities/Impairments listed. [] Progression has been slowed due to co-morbidities. [] Plan just implemented, too soon to assess goals progression <30days   [] Goals require adjustment due to lack of progress  [] Patient is not progressing as expected and requires additional follow up with physician  [] Other    Prognosis for POC: [x] Good [] Fair  [] Poor    Patient requires continued skilled intervention: [x] Yes  [] No        PLAN:   [x] Continue per plan of care [] Alter current plan (see comments)  [] Plan of care initiated [] Hold pending MD visit [] Discharge    Electronically signed by: Marina Peña PT    Note: If patient does not return for scheduled/recommended follow up visits, this note will serve as a discharge from care along with the most recent update on progress.

## 2020-06-24 ENCOUNTER — HOSPITAL ENCOUNTER (OUTPATIENT)
Dept: PHYSICAL THERAPY | Age: 80
Setting detail: THERAPIES SERIES
Discharge: HOME | End: 2020-06-24
Payer: MEDICARE

## 2020-06-24 ENCOUNTER — APPOINTMENT (OUTPATIENT)
Dept: PHYSICAL THERAPY | Age: 80
End: 2020-06-24
Payer: MEDICARE

## 2020-06-24 PROCEDURE — 97140 MANUAL THERAPY 1/> REGIONS: CPT

## 2020-06-24 PROCEDURE — 97110 THERAPEUTIC EXERCISES: CPT

## 2020-06-24 NOTE — FLOWSHEET NOTE
[place] [address] [address] [place]  Phone: [phone] Fax: [phone]    Physical Therapy Treatment Note/ Progress Report:     Date:  2020    Patient Name:  Joceline Aguilar    :  [birth year]  MRN: [id number]  Restrictions/Precautions:    Medical/Treatment Diagnosis Information:  · Diagnosis: L knee OA s/p L TKA - 2/3/20  · Treatment Diagnosis: M17.11, X08.389  Insurance/Certification information:  PT Insurance Information: Aetna Medicare  Physician Information:  Referring Practitioner: Jordin Salas  Plan of care signed (Y/N):     Date of Patient follow up with Physician: 20     Progress Report: []  Yes  [x]  No     Date Range for reporting period:  Beginnin20  Ending:  NA    Progress report due (10 Rx/or 30 days whichever is less):      Recertification due (POC duration/ or 90 days whichever is less): 4/3/20     Visit # Insurance Allowable Auth Needed   26 Medical necessity []Yes   [x]No     Latex Allergy:  [x]NO      []YES  Preferred Language for Healthcare:   [x]English       []other:  Functional Scale: LEFS - 86% impairment   Date assessed:20    Pain level:  3-4/10     SUBJECTIVE:  Patient states continued improvement in left knee. SHe is making good progress with extension and has been adding in some pilates at home. OBJECTIVE:.     Observation: tender through posterior medial knee, improved motion noted   Test measurements: crepitus relived with soft tissue work to medial hamstring       RESTRICTIONS/PRECAUTIONS: Hx of L MICHAEL, R TKA    Exercises/Interventions:     Therapeutic Ex (75283)   Min: 35 Sets/sec Reps Notes/CUES   Retro Stepper/BIKE 5'     Calf stretch 30\" 3 Incline w/ slight ER         QS Supine, towel under knee   Long sitting calf stretch towel   Long sitting HS stretch W/ slight ER; reviewed for HEP   Heel prop   towel   Sportcord     SLR/SAQ With stim   SL hip ABD Calf raise      Standing hip abd Each leg   BOSU fwd/side lunge 1 20 5\"   BOSU squat      Leg Press  0- 2 10 90#   HS curl 20#   TKE With ball at wall on stim   Glute side walks 15' 2 orange   RDL 2 10 10#   Slide Lunge 2 10    Slide HS eccentrics      Step ups 2 10 8\"; cues for anterior weight shift    Swissball wall rolls- in SLS- hip drive      Quad hip ext/wall-ball rolls      Gait training      SLS on airex 15\" 3                      Manual Intervention (98452)  Min: 15'      Knee mobs/PROM/STM 15'  Distal medial HS/proximal medial gastroc on stretch; pin & stretch HS in supine   Tib/Fem Mobs      Patella Mobs      Ankle mobs                   CUES NEEDED   [place]incentian/Biofeedback 10/10     BFR      G. Med activation      Hip Ext full ROM/ G. Activation      Bosu Bal and Prop- G Med      Single leg stance/Balance/Prop      Bosu Retro G. Med act      Prone Hip froggers- sliders/elevated            Therapeutic Activity (53995)  Min:      Ladders      Plyos      Dynamic Balance                            Therapeutic Exercise and NMR EXR  [x] (56548) Provided verbal/tactile cueing for activities related to strengthening, flexibility, endurance, ROM for improvements in LE, proximal hip, and core control with self care, mobility, lifting, ambulation. [x] (16746) Provided verbal/tactile cueing for activities related to improving balance, coordination, kinesthetic sense, posture, motor skill, proprioception  to assist with LE, proximal hip, and core control in self care, mobility, lifting, ambulation and eccentric single leg control.      NMR and Therapeutic Activities:    [x] (83220 or 90042) Provided verbal/tactile cueing for activities related to improving balance, coordination, kinesthetic sense, posture, motor skill, proprioception and motor activation to allow for proper function of core, proximal hip and LE with self care and ADLs and functional mobility.   [] (68197) Gait Re-education- Provided training and

## 2020-07-01 ENCOUNTER — HOSPITAL ENCOUNTER (OUTPATIENT)
Dept: PHYSICAL THERAPY | Age: 80
Setting detail: THERAPIES SERIES
Discharge: HOME | End: 2020-07-01
Payer: MEDICARE

## 2020-07-01 PROCEDURE — 97110 THERAPEUTIC EXERCISES: CPT

## 2020-07-01 PROCEDURE — 97140 MANUAL THERAPY 1/> REGIONS: CPT

## 2020-07-01 NOTE — FLOWSHEET NOTE
[place] [address] [address] [place]  Phone: [phone] Fax: [phone]    Physical Therapy Treatment Note/ Progress Report:     Date:  2020    Patient Name:  Jarad Dover    :    MRN: [id number]  Restrictions/Precautions:    Medical/Treatment Diagnosis Information:  · Diagnosis: L knee OA s/p L TKA - 2/3/20  · Treatment Diagnosis: M17.11, N85.904  Insurance/Certification information:  PT Insurance Information: Aetna Medicare  Physician Information:  Referring Practitioner: Carmelo Corona  Plan of care signed (Y/N):     Date of Patient follow up with Physician: 20     Progress Report: []  Yes  [x]  No     Date Range for reporting period:  Beginnin20  Ending:  NA    Progress report due (10 Rx/or 30 days whichever is less):      Recertification due (POC duration/ or 90 days whichever is less): 4/3/20     Visit # Insurance Allowable Auth Needed   27 Medical necessity []Yes   [x]No     Latex Allergy:  [x]NO      []YES  Preferred Language for Healthcare:   [x]English       []other:  Functional Scale: LEFS - 86% impairment   Date assessed:20    Pain level:  3-4/10     SUBJECTIVE:  Patient reports that her knee is making slow progress. States that she has been doing pilates and treadmill walking. Chief complaint is stiffness when she starts walking after sitting. OBJECTIVE:.     Observation: tender through posterior medial knee, improved motion noted   Test measurements: crepitus relived with soft tissue work to medial hamstring       RESTRICTIONS/PRECAUTIONS: Hx of L MICHAEL, R TKA    Exercises/Interventions:     Therapeutic Ex (07434)   Min: 40 Sets/sec Reps Notes/CUES   Retro Stepper/BIKE 5'     Calf stretch 30\" 3 Incline w/ slight ER         QS Supine, towel under knee   Long sitting calf stretch towel   Long sitting HS stretch W/ slight ER; reviewed for HEP   Heel prop   towel   Sportcord March 2 20    SLR/SAQ With stim   SL hip ABD      Calf raise      Standing hip abd Each leg   BOSU fwd/side lunge 1 20 5\"   BOSU squat      Leg Press  0- 2 10 90#   HS curl 20#   TKE With ball at wall on stim   Glute side walks 15' 2 orange   RDL 2 10 10#   Slide Lunge 2 10    Slide HS eccentrics      Step ups 2 10 8\"; cues for anterior weight shift    Swissball wall rolls- in SLS- hip drive      Quad hip ext/wall-ball rolls      Gait training      SLS on airex 15\" 3                      Manual Intervention (60026)  Min: 20'      Knee mobs/PROM/STM 20'  Distal medial HS/proximal medial gastroc on stretch; pin & stretch HS in supine   Tib/Fem Mobs      Patella Mobs      Ankle mobs                   CUES NEEDED   Citizen of [place]/Biofeedback 10/10     BFR      G. Med activation      Hip Ext full ROM/ G. Activation      Bosu Bal and Prop- G Med      Single leg stance/Balance/Prop      Bosu Retro G. Med act      Prone Hip froggers- sliders/elevated            Therapeutic Activity (76183)  Min:      Ladders      Plyos      Dynamic Balance                            Therapeutic Exercise and NMR EXR  [x] (89567) Provided verbal/tactile cueing for activities related to strengthening, flexibility, endurance, ROM for improvements in LE, proximal hip, and core control with self care, mobility, lifting, ambulation. [x] (83854) Provided verbal/tactile cueing for activities related to improving balance, coordination, kinesthetic sense, posture, motor skill, proprioception  to assist with LE, proximal hip, and core control in self care, mobility, lifting, ambulation and eccentric single leg control.      NMR and Therapeutic Activities:    [x] (82741 or 73733) Provided verbal/tactile cueing for activities related to improving balance, coordination, kinesthetic sense, posture, motor skill, proprioception and motor activation to allow for proper function of core, proximal hip and LE with self care and ADLs and functional mobility.   [] (55867) Gait Re-education- Provided training and instruction to the patient for proper LE, core and proximal hip recruitment and positioning and eccentric body weight control with ambulation re-education including up and down stairs     Home Exercise Program:    [x] (37833) Reviewed/Progressed HEP activities related to strengthening, flexibility, endurance, ROM of core, proximal hip and LE for functional self-care, mobility, lifting and ambulation/stair navigation   [] (51737)Reviewed/Progressed HEP activities related to improving balance, coordination, kinesthetic sense, posture, motor skill, proprioception of core, proximal hip and LE for self care, mobility, lifting, and ambulation/stair navigation      Manual Treatments:  PROM / STM / Oscillations-Mobs:  G-I, II, III, IV (PA's, Inf., Post.)  [x] (10837) Provided manual therapy to mobilize LE, proximal hip and/or LS spine soft tissue/joints for the purpose of modulating pain, promoting relaxation,  increasing ROM, reducing/eliminating soft tissue swelling/inflammation/restriction, improving soft tissue extensibility and allowing for proper ROM for normal function with self care, mobility, lifting and ambulation. Modalities:     [] GAME READY (VASO)- for significant edema, swelling, pain control. - to L knee with patient in supine with LLE elevated on 2 pillows, moderate compression x 10 minutes    Charges:  Timed Code Treatment Minutes: 60   Total Treatment Minutes: 60      [] EVAL (LOW) 52198 (typically 20 minutes face-to-face)  [] EVAL (MOD) 40110 (typically 30 minutes face-to-face)  [] EVAL (HIGH) 66336 (typically 45 minutes face-to-face)  [] RE-EVAL     [x] IQ(54560) x 2    [] IONTO (92336)  [] NMR (69658) x     [] VASO (38681)  [x] Manual (96020) x2     [] Other:  [] TA (74748)x     [] Mech Traction (11819)  [] ES(attended) (04084)     [] ES (un) (87298):      If BW Please Indicate Time In/Out  CPT Code Time in Time out GOALS:  Patient stated goal: pain-free, return to PLOF including unlimited walking/exercise  [] Progressing: [] Met: [] Not Met: [] Adjusted     Therapist goals for Patient:   Short Term Goals: To be achieved in: 2 weeks  1. Independent in HEP and progression per patient tolerance, in order to prevent re-injury. [] Progressing: [] Met: [] Not Met: [] Adjusted  2. Patient will have a decrease in pain to facilitate improvement in movement, function, and ADLs as indicated by Functional Deficits. [] Progressing: [] Met: [] Not Met: [] Adjusted    Long Term Goals: To be achieved in: 8 weeks  1. Disability index score of 30% or less for the LEFS to assist with reaching prior level of function. [] Progressing: [] Met: [] Not Met: [] Adjusted  2. Patient will demonstrate increased AROM to 0-120 or greater to allow for proper joint functioning as indicated by patients Functional Deficits. [] Progressing: [] Met: [] Not Met: [] Adjusted  3. Patient will demonstrate an increase in Strength to good proximal hip strength and control, within 5lb HHD in LE to allow for proper functional mobility as indicated by patients Functional Deficits. [] Progressing: [] Met: [] Not Met: [] Adjusted  4. Patient will return to sitting, standing, transfers, ambulation, squatting, stairs, kneeling  functional activities without increased symptoms or restriction. [] Progressing: [] Met: [] Not Met: [] Adjusted    ASSESSMENT:  Patient continues to make great gains with therapy and should be working toward DC in a week or so. IASTM helped with tightness in HS and Quad. Full ext PROM.      Return to Play: (if applicable)   []  Stage 1: Intro to Strength   []  Stage 2: Return to Run and Strength   []  Stage 3: Return to Jump and Strength   []  Stage 4: Dynamic Strength and Agility   []  Stage 5: Sport Specific Training     []  Ready to Return to Play, Meets All Above Stages   []  Not Ready for Return to Sports   Comments: Treatment/Activity Tolerance:  [x] Patient tolerated treatment well [] Patient limited by fatique  [] Patient limited by pain  [] Patient limited by other medical complications  [] Other:     Overall Progression Towards Functional goals/ Treatment Progress Update:  [x] Patient is progressing as expected towards functional goals listed. [] Progression is slowed due to complexities/Impairments listed. [] Progression has been slowed due to co-morbidities. [] Plan just implemented, too soon to assess goals progression <30days   [] Goals require adjustment due to lack of progress  [] Patient is not progressing as expected and requires additional follow up with physician  [] Other    Prognosis for POC: [x] Good [] Fair  [] Poor    Patient requires continued skilled intervention: [x] Yes  [] No        PLAN:   [x] Continue per plan of care [] Alter current plan (see comments)  [] Plan of care initiated [] Hold pending MD visit [] Discharge    Electronically signed by: Yasmine Manley PTA    Note: If patient does not return for scheduled/recommended follow up visits, this note will serve as a discharge from care along with the most recent update on progress.

## 2020-07-08 ENCOUNTER — HOSPITAL ENCOUNTER (OUTPATIENT)
Dept: PHYSICAL THERAPY | Age: 80
Setting detail: THERAPIES SERIES
Discharge: HOME | End: 2020-07-08
Payer: MEDICARE

## 2020-07-08 PROCEDURE — 97110 THERAPEUTIC EXERCISES: CPT

## 2020-07-08 PROCEDURE — 97140 MANUAL THERAPY 1/> REGIONS: CPT

## 2020-07-08 NOTE — FLOWSHEET NOTE
Calf raise      Standing hip abd Each leg   BOSU fwd/side lunge 1 20 5\"   BOSU squat      Leg Press  0- 2 10 100#   HS curl 20#   TKE With ball at wall on stim   Glute side walks 15' 2 orange   RDL 2 10 10#   Slide Lunge 2 10    Slide HS eccentrics      Step ups 2 10 8\"; cues for anterior weight shift    Swissball wall rolls- in SLS- hip drive      Quad hip ext/wall-ball rolls      Gait training      SLS on airex 15\" 3                      Manual Intervention (28872)  Min: 20'      Knee mobs/PROM/STM 20'  Distal medial HS/proximal medial gastroc on stretch; pin & stretch HS in supine   Tib/Fem Mobs      Patella Mobs      Ankle mobs                   CUES NEEDED   [place]/Biofeedback 10/10     BFR      G. Med activation      Hip Ext full ROM/ G. Activation      Bosu Bal and Prop- G Med      Single leg stance/Balance/Prop      Bosu Retro G. Med act      Prone Hip froggers- sliders/elevated            Therapeutic Activity (65429)  Min:      Ladders      Plyos      Dynamic Balance                            Therapeutic Exercise and NMR EXR  [x] (11280) Provided verbal/tactile cueing for activities related to strengthening, flexibility, endurance, ROM for improvements in LE, proximal hip, and core control with self care, mobility, lifting, ambulation. [x] (69333) Provided verbal/tactile cueing for activities related to improving balance, coordination, kinesthetic sense, posture, motor skill, proprioception  to assist with LE, proximal hip, and core control in self care, mobility, lifting, ambulation and eccentric single leg control.      NMR and Therapeutic Activities:    [x] (37860 or 07297) Provided verbal/tactile cueing for activities related to improving balance, coordination, kinesthetic sense, posture, motor skill, proprioception and motor activation to allow for proper function of core, proximal hip and LE with self care and ADLs and functional mobility.   [] (23500) Gait Re-education- Provided training and instruction to the patient for proper LE, core and proximal hip recruitment and positioning and eccentric body weight control with ambulation re-education including up and down stairs     Home Exercise Program:    [x] (74261) Reviewed/Progressed HEP activities related to strengthening, flexibility, endurance, ROM of core, proximal hip and LE for functional self-care, mobility, lifting and ambulation/stair navigation   [] (59486)Reviewed/Progressed HEP activities related to improving balance, coordination, kinesthetic sense, posture, motor skill, proprioception of core, proximal hip and LE for self care, mobility, lifting, and ambulation/stair navigation      Manual Treatments:  PROM / STM / Oscillations-Mobs:  G-I, II, III, IV (PA's, Inf., Post.)  [x] (79817) Provided manual therapy to mobilize LE, proximal hip and/or LS spine soft tissue/joints for the purpose of modulating pain, promoting relaxation,  increasing ROM, reducing/eliminating soft tissue swelling/inflammation/restriction, improving soft tissue extensibility and allowing for proper ROM for normal function with self care, mobility, lifting and ambulation. Modalities:     [] GAME READY (VASO)- for significant edema, swelling, pain control. - to L knee with patient in supine with LLE elevated on 2 pillows, moderate compression x 10 minutes    Charges:  Timed Code Treatment Minutes: 60   Total Treatment Minutes: 60      [] EVAL (LOW) 61348 (typically 20 minutes face-to-face)  [] EVAL (MOD) 04322 (typically 30 minutes face-to-face)  [] EVAL (HIGH) 80558 (typically 45 minutes face-to-face)  [] RE-EVAL     [x] NZ(73445) x 2    [] IONTO (63189)  [] NMR (23160) x     [] VASO (44081)  [x] Manual (86389) x2     [] Other:  [] TA (32492)x     [] Mech Traction (96724)  [] ES(attended) (11027)     [] ES (un) (54448):      If BWC Please Indicate Time In/Out  CPT Code Time in Time out                                   GOALS:  Patient stated goal: pain-free, return to

## 2020-07-15 ENCOUNTER — HOSPITAL ENCOUNTER (OUTPATIENT)
Dept: PHYSICAL THERAPY | Age: 80
Setting detail: THERAPIES SERIES
Discharge: HOME | End: 2020-07-15
Payer: MEDICARE

## 2020-07-15 PROCEDURE — 97140 MANUAL THERAPY 1/> REGIONS: CPT

## 2020-07-15 PROCEDURE — 97110 THERAPEUTIC EXERCISES: CPT

## 2020-07-15 NOTE — FLOWSHEET NOTE
[place] [address] [address]  Phone: [phone] Fax: [phone]    Physical Therapy Treatment Note/ Progress Report:     Date:  7/15/2020    Patient Name:  Shonda Magallanes    :  [birth year]  MRN: [id number]  Restrictions/Precautions:    Medical/Treatment Diagnosis Information:  · Diagnosis: L knee OA s/p L TKA - 2/3/20  · Treatment Diagnosis: M17.11, S65.189  Insurance/Certification information:  PT Insurance Information: Aetna Medicare  Physician Information:  Referring Practitioner: Trisha Borrero  Plan of care signed (Y/N):     Date of Patient follow up with Physician: 20     Progress Report: []  Yes  [x]  No     Date Range for reporting period:  Beginnin20  Ending:  NA    Progress report due (10 Rx/or 30 days whichever is less): 82     Recertification due (POC duration/ or 90 days whichever is less): 4/3/20     Visit # Insurance Allowable Auth Needed   29 Medical necessity []Yes   [x]No     Latex Allergy:  [x]NO      []YES  Preferred Language for Healthcare:   [x]English       []other:  Functional Scale: LEFS - 86% impairment   Date assessed:20    Pain level:  3-4/10     SUBJECTIVE:  Patient states that she is feeling much better overall however, she is sore today after climbing in and out of the boat yesterday. OBJECTIVE:.     Observation:   Test measurements: 8-134* left knee motion       RESTRICTIONS/PRECAUTIONS: Hx of L MICHAEL, R TKA    Exercises/Interventions:     Therapeutic Ex (39435)   Min: 30 Sets/sec Reps Notes/CUES   Retro Stepper/BIKE 5'     Calf stretch 30\" 3 Incline w/ slight ER         QS Supine, towel under knee   Long sitting calf stretch towel   Long sitting HS stretch W/ slight ER; reviewed for HEP   Heel prop   towel   Sportcord     SLR/SAQ With stim   SL hip ABD      Calf raise      Standing hip abd Each leg   BOSU fwd/side lunge  5\"   BOSU squat      Leg Press  0- 2 10 100#   HS curl 20#   TKE With ball at wall on stim   Glute side walks 15' 2 orange   RDL   10#   Slide Lunge      Slide HS eccentrics      Step ups 2 10 8\"; cues for anterior weight shift    Swissball wall rolls- in SLS- hip drive      Quad hip ext/wall-ball rolls      Gait training      SLS on airex 15\" 3                      Manual Intervention (51575)  Min: 20'      Knee mobs/PROM/STM 20'  Distal medial HS/proximal medial gastroc on stretch; pin & stretch HS in supine   Tib/Fem Mobs      Patella Mobs      Ankle mobs                   CUES NEEDED   [place]/Biofeedback 10/10     BFR      G. Med activation      Hip Ext full ROM/ G. Activation      Bosu Bal and Prop- G Med      Single leg stance/Balance/Prop      Bosu Retro G. Med act      Prone Hip froggers- sliders/elevated            Therapeutic Activity (10571)  Min:      Ladders      Plyos      Dynamic Balance                            Therapeutic Exercise and NMR EXR  [x] (61445) Provided verbal/tactile cueing for activities related to strengthening, flexibility, endurance, ROM for improvements in LE, proximal hip, and core control with self care, mobility, lifting, ambulation. [x] (34225) Provided verbal/tactile cueing for activities related to improving balance, coordination, kinesthetic sense, posture, motor skill, proprioception  to assist with LE, proximal hip, and core control in self care, mobility, lifting, ambulation and eccentric single leg control.      NMR and Therapeutic Activities:    [x] (39509 or 55451) Provided verbal/tactile cueing for activities related to improving balance, coordination, kinesthetic sense, posture, motor skill, proprioception and motor activation to allow for proper function of core, proximal hip and LE with self care and ADLs and functional mobility.   [] (73549) Gait Re-education- Provided training and instruction to the patient for proper LE, core and proximal hip recruitment and positioning and eccentric body weight control with ambulation re-education including up and down stairs     Home Exercise Program:    [x] (58715) Reviewed/Progressed HEP activities related to strengthening, flexibility, endurance, ROM of core, proximal hip and LE for functional self-care, mobility, lifting and ambulation/stair navigation   [] (81468)Reviewed/Progressed HEP activities related to improving balance, coordination, kinesthetic sense, posture, motor skill, proprioception of core, proximal hip and LE for self care, mobility, lifting, and ambulation/stair navigation      Manual Treatments:  PROM / STM / Oscillations-Mobs:  G-I, II, III, IV (PA's, Inf., Post.)  [x] (52869) Provided manual therapy to mobilize LE, proximal hip and/or LS spine soft tissue/joints for the purpose of modulating pain, promoting relaxation,  increasing ROM, reducing/eliminating soft tissue swelling/inflammation/restriction, improving soft tissue extensibility and allowing for proper ROM for normal function with self care, mobility, lifting and ambulation. Modalities:     [] GAME READY (VASO)- for significant edema, swelling, pain control. - to L knee with patient in supine with LLE elevated on 2 pillows, moderate compression x 10 minutes    Charges:  Timed Code Treatment Minutes: 50   Total Treatment Minutes: 50      [] EVAL (LOW) 42390 (typically 20 minutes face-to-face)  [] EVAL (MOD) 00671 (typically 30 minutes face-to-face)  [] EVAL (HIGH) 34685 (typically 45 minutes face-to-face)  [] RE-EVAL     [x] CU(45208) x 2    [] IONTO (80889)  [] NMR (36250) x     [] VASO (42926)  [x] Manual (53219) x1     [] Other:  [] TA (82198)x     [] Mech Traction (05401)  [] ES(attended) (12271)     [] ES (un) (11787):      If BW Please Indicate Time In/Out  CPT Code Time in Time out                                   GOALS:  Patient stated goal: pain-free, return to PLOF including unlimited walking/exercise  [] Progressing: [] Met: [] Not Met: [] Adjusted     Therapist goals for Patient:   Short Term Goals: To be achieved in: 2 weeks  1. Independent in HEP and progression per patient tolerance, in order to prevent re-injury. [] Progressing: [] Met: [] Not Met: [] Adjusted  2. Patient will have a decrease in pain to facilitate improvement in movement, function, and ADLs as indicated by Functional Deficits. [] Progressing: [] Met: [] Not Met: [] Adjusted    Long Term Goals: To be achieved in: 8 weeks  1. Disability index score of 30% or less for the LEFS to assist with reaching prior level of function. [] Progressing: [] Met: [] Not Met: [] Adjusted  2. Patient will demonstrate increased AROM to 0-120 or greater to allow for proper joint functioning as indicated by patients Functional Deficits. [] Progressing: [] Met: [] Not Met: [] Adjusted  3. Patient will demonstrate an increase in Strength to good proximal hip strength and control, within 5lb HHD in LE to allow for proper functional mobility as indicated by patients Functional Deficits. [] Progressing: [] Met: [] Not Met: [] Adjusted  4. Patient will return to sitting, standing, transfers, ambulation, squatting, stairs, kneeling  functional activities without increased symptoms or restriction. [] Progressing: [] Met: [] Not Met: [] Adjusted    ASSESSMENT:  Patient is doing really well with rehab to date. She is comfortable with HEP at this time and will recheck with me in 2 weeks.      Return to Play: (if applicable)   []  Stage 1: Intro to Strength   []  Stage 2: Return to Run and Strength   []  Stage 3: Return to Jump and Strength   []  Stage 4: Dynamic Strength and Agility   []  Stage 5: Sport Specific Training     []  Ready to Return to Play, Meets All Above Stages   []  Not Ready for Return to Sports   Comments:            Treatment/Activity Tolerance:  [x] Patient tolerated treatment well [] Patient limited by fatique  [] Patient limited by pain  [] Patient limited by other medical complications  [] Other:     Overall Progression Towards Functional goals/ Treatment Progress Update:  [x] Patient is progressing as expected towards functional goals listed. [] Progression is slowed due to complexities/Impairments listed. [] Progression has been slowed due to co-morbidities. [] Plan just implemented, too soon to assess goals progression <30days   [] Goals require adjustment due to lack of progress  [] Patient is not progressing as expected and requires additional follow up with physician  [] Other    Prognosis for POC: [x] Good [] Fair  [] Poor    Patient requires continued skilled intervention: [x] Yes  [] No        PLAN:   [x] Continue per plan of care [] Alter current plan (see comments)  [] Plan of care initiated [] Hold pending MD visit [] Discharge    Electronically signed by: Mamta Costa PT    Note: If patient does not return for scheduled/recommended follow up visits, this note will serve as a discharge from care along with the most recent update on progress.

## 2020-07-29 ENCOUNTER — APPOINTMENT (OUTPATIENT)
Dept: PHYSICAL THERAPY | Age: 80
End: 2020-07-29
Payer: MEDICARE

## 2022-08-08 NOTE — FLOWSHEET NOTE
[place] [address] [address]  Phone: [phone] Fax: [phone]    Physical Therapy Treatment Note/ Progress Report:     Date:  2020    Patient Name:  Dian Orona    :  [birth year]  MRN: [id number]  Restrictions/Precautions:    Medical/Treatment Diagnosis Information:  · Diagnosis: L knee OA s/p L TKA - 2/3/20  · Treatment Diagnosis: M17.11, S91.254  Insurance/Certification information:  PT Insurance Information: Aetna Medicare  Physician Information:  Referring Practitioner: Ana Bee  Plan of care signed (Y/N):     Date of Patient follow up with Physician: 20     Progress Report: []  Yes  [x]  No     Date Range for reporting period:  Beginnin20  Ending:  NA    Progress report due (10 Rx/or 30 days whichever is less):      Recertification due (POC duration/ or 90 days whichever is less): 4/3/20     Visit # Insurance Allowable Auth Needed   13 Medical necessity []Yes   [x]No     Latex Allergy:  [x]NO      []YES  Preferred Language for Healthcare:   [x]English       []other:  Functional Scale: LEFS - 86% impairment   Date assessed:20    Pain level:  3-4/10     SUBJECTIVE:  Patient states she felt better after last visit and feels like she is walking better but does report she still has some discomfort and tightness in the posterior knee when L leg is pushing off and transitioning to bending. OBJECTIVE:.  Improved quad contraction noted and able to get through full revolution on bike bwd     Observation: ambulating w/ decreased L push off and knee flexion; improved after manual    Test measurements:  5-95* left knee    RESTRICTIONS/PRECAUTIONS: Hx of L MICHAEL, R TKA    Exercises/Interventions:     Therapeutic Ex (75565)   Min: 25 Sets/sec Reps Notes/CUES   Retro Stepper/BIKE 5'     Calf stretch 30\" 3 Incline w/ slight ER         QS Supine, towel under knee   Long sitting calf stretch towel [FreeTextEntry1] : Pelvic Prolapse: \par -Continue with Ring with support # 4.\par -Pessary precautions and instructions were reviewed.\par \par Vaginal atrophy:\par -PT using Replens 3x a week and managing well.\par \par F/U in 3 months for pelvic prolapse. \par Advised p to call office with any questions or concerns. Long sitting HS stretch 30 3 W/ slight ER; reviewed for HEP   Heel prop   towel   Sportcord March    SLR/SAQ With stim   SL hip ABD      Calf raise      Standing hip abd Each leg   BOSU fwd/side lunge 5\"   BOSU squat      Leg Press  0- 2 10 70#   HS curl    TKE 2 20 With ball at wall on stim   Glute side walks orange   RDL      Slide Lunge 2 10    Slide HS eccentrics      Step ups 2 10 8\"; cues for anterior weight shift    Swissball wall rolls- in SLS- hip drive      Quad hip ext/wall-ball rolls      Gait training 5'                             Manual Intervention (71735)  Min: 30'      Knee mobs/PROM/STM 30'  Distal medial HS/proximal medial gastroc on stretch; pin & stretch HS in supine   Tib/Fem Mobs      Patella Mobs      Ankle mobs                   CUES NEEDED   [place]/Biofeedback 10/10     BFR      G. Med activation      Hip Ext full ROM/ G. Activation      Bosu Bal and Prop- G Med      Single leg stance/Balance/Prop      Bosu Retro G. Med act      Prone Hip froggers- sliders/elevated            Therapeutic Activity (08152)  Min:      eeGeos      YouMailos      Dynamic Balance                            Therapeutic Exercise and NMR EXR  [x] (51259) Provided verbal/tactile cueing for activities related to strengthening, flexibility, endurance, ROM for improvements in LE, proximal hip, and core control with self care, mobility, lifting, ambulation. [x] (54468) Provided verbal/tactile cueing for activities related to improving balance, coordination, kinesthetic sense, posture, motor skill, proprioception  to assist with LE, proximal hip, and core control in self care, mobility, lifting, ambulation and eccentric single leg control.      NMR and Therapeutic Activities:    [x] (92386 or 59994) Provided verbal/tactile cueing for activities related to improving balance, coordination, kinesthetic sense, posture, motor skill, proprioception and motor activation to allow for proper function of core, proximal hip and LE with self care and ADLs and functional mobility.   [] (50901) Gait Re-education- Provided training and instruction to the patient for proper LE, core and proximal hip recruitment and positioning and eccentric body weight control with ambulation re-education including up and down stairs     Home Exercise Program:    [x] (91567) Reviewed/Progressed HEP activities related to strengthening, flexibility, endurance, ROM of core, proximal hip and LE for functional self-care, mobility, lifting and ambulation/stair navigation   [] (31537)Reviewed/Progressed HEP activities related to improving balance, coordination, kinesthetic sense, posture, motor skill, proprioception of core, proximal hip and LE for self care, mobility, lifting, and ambulation/stair navigation      Manual Treatments:  PROM / STM / Oscillations-Mobs:  G-I, II, III, IV (PA's, Inf., Post.)  [x] (52624) Provided manual therapy to mobilize LE, proximal hip and/or LS spine soft tissue/joints for the purpose of modulating pain, promoting relaxation,  increasing ROM, reducing/eliminating soft tissue swelling/inflammation/restriction, improving soft tissue extensibility and allowing for proper ROM for normal function with self care, mobility, lifting and ambulation. Modalities:     [x] GAME READY (VASO)- for significant edema, swelling, pain control. - to L knee with patient in supine with LLE elevated on 2 pillows, moderate compression x 10 minutes    Charges:  Timed Code Treatment Minutes: 55   Total Treatment Minutes: 55      [] EVAL (LOW) 66708 (typically 20 minutes face-to-face)  [] EVAL (MOD) 14421 (typically 30 minutes face-to-face)  [] EVAL (HIGH) 02362 (typically 45 minutes face-to-face)  [] RE-EVAL     [x] QX(64149) x 2    [] IONTO (46104)  [] NMR (56182) x     [] VASO (49180)  [x] Manual (53841) x2     [] Other:  [] TA (42036)x     [] Mech Traction (52681)  [] ES(attended) (46806)     [] ES (un) (11237):      If BW Please Indicate Time In/Out  CPT Agility   []  Stage 5: Sport Specific Training     []  Ready to Return to Play, [employer]   []  Not Ready for Return to Sports   Comments:            Treatment/Activity Tolerance:  [x] Patient tolerated treatment well [] Patient limited by fatique  [] Patient limited by pain  [] Patient limited by other medical complications  [] Other:     Overall Progression Towards Functional goals/ Treatment Progress Update:  [x] Patient is progressing as expected towards functional goals listed. [] Progression is slowed due to complexities/Impairments listed. [] Progression has been slowed due to co-morbidities. [] Plan just implemented, too soon to assess goals progression <30days   [] Goals require adjustment due to lack of progress  [] Patient is not progressing as expected and requires additional follow up with physician  [] Other    Prognosis for POC: [x] Good [] Fair  [] Poor    Patient requires continued skilled intervention: [x] Yes  [] No        PLAN: Return to formal therapy at 2X/week to decrease adhesions and promote further functional strength  [x] Continue per plan of care [] Alter current plan (see comments)  [] Plan of care initiated [] Hold pending MD visit [] Discharge    Electronically signed by: Shy Still PT    Note: If patient does not return for scheduled/recommended follow up visits, this note will serve as a discharge from care along with the most recent update on progress.

## 2024-08-26 DIAGNOSIS — M51.36 DEGENERATIVE DISC DISEASE, LUMBAR: Primary | ICD-10-CM

## 2024-08-29 ENCOUNTER — HOSPITAL ENCOUNTER (OUTPATIENT)
Dept: PHYSICAL THERAPY | Age: 84
Setting detail: THERAPIES SERIES
Discharge: HOME | End: 2024-08-29
Payer: MEDICARE

## 2024-08-29 DIAGNOSIS — G89.29 CHRONIC MIDLINE LOW BACK PAIN WITHOUT SCIATICA: Primary | ICD-10-CM

## 2024-08-29 DIAGNOSIS — M54.50 CHRONIC MIDLINE LOW BACK PAIN WITHOUT SCIATICA: Primary | ICD-10-CM

## 2024-08-29 PROCEDURE — 97161 PT EVAL LOW COMPLEX 20 MIN: CPT

## 2024-08-29 PROCEDURE — 97140 MANUAL THERAPY 1/> REGIONS: CPT

## 2024-08-29 PROCEDURE — 97110 THERAPEUTIC EXERCISES: CPT

## 2024-08-29 NOTE — PLAN OF CARE
[facility] - Outpatient Rehabilitation and Therapy [address] [place] [id number] office: [phone] fax: [phone]     Physical Therapy Initial Evaluation Certification      Dear Sofia Valladares MD,    We had the pleasure of evaluating the following patient for physical therapy services at [facility] Out[facility].  A summary of our findings can be found in the initial assessment below.  This includes our plan of care.  If you have any questions or concerns regarding these findings, please do not hesitate to contact me at the office phone number listed above.  Thank you for the referral.     Physician Signature:_______________________________Date:__________________  By signing above (or electronic signature), therapist’s plan is approved by physician       Physical Therapy: TREATMENT/PROGRESS NOTE   Patient: Lissy Duke (84 y.o. female)   Examination Date: 2024   :  1940 MRN: [id number]   Visit #: 1   Insurance Allowable Auth Needed    []Yes    []No    Insurance: Payor: AETNA MEDICARE / Plan: AETNA MEDICARE-ADVANTAGE PPO / Product Type: Medicare /   Insurance ID: [id number] - (Medicare Managed)  Secondary Insurance (if applicable):    Treatment Diagnosis:       ICD-10-CM    1. Chronic midline low back pain without sciatica  M54.50     G89.29          Medical Diagnosis:  Degenerative disc disease, lumbar [M51.36]   Referring Physician: Sofia Valladares MD  PCP: Gaurav Kwok MD     Plan of care signed (Y/N):     Date of Patient follow up with Physician:      Plan of Care Report: EVAL today  POC update due: (10 visits /OR AUTH LIMITS, whichever is less)  2024                                             Medical History:  Comorbidities:  Cancer/Tumor  Hypertension  Osteoporosis/Osteopenia  Osteoarthritis  Relevant Medical History: left MICHAEL                                         Precautions/ Contra-indications:           Latex allergy:  NO  Pacemaker:

## 2024-09-05 ENCOUNTER — HOSPITAL ENCOUNTER (OUTPATIENT)
Dept: PHYSICAL THERAPY | Age: 84
Setting detail: THERAPIES SERIES
Discharge: HOME | End: 2024-09-05
Payer: MEDICARE

## 2024-09-05 PROCEDURE — 97140 MANUAL THERAPY 1/> REGIONS: CPT

## 2024-09-05 PROCEDURE — 97110 THERAPEUTIC EXERCISES: CPT

## 2024-09-05 NOTE — FLOWSHEET NOTE
[facility] - Outpatient Rehabilitation and Therapy [address] [place] [address] office: [phone] fax: [phone]        Physical Therapy: TREATMENT/PROGRESS NOTE   Patient: Lissy Duke (84 y.o. female)   Examination Date: 2024   :  1940 MRN: [id number]   Visit #: 2   Insurance Allowable Auth Needed    []Yes    []No    Insurance: Payor: AETNA MEDICARE / Plan: AETNA MEDICARE-ADVANTAGE PPO / Product Type: Medicare /   Insurance ID: [id number] - (Medicare Managed)  Secondary Insurance (if applicable):    Treatment Diagnosis:       ICD-10-CM    1. Chronic midline low back pain without sciatica  M54.50     G89.29          Medical Diagnosis:  Degenerative disc disease, lumbar [M51.36]   Referring Physician: Sofia Valladares MD  PCP: Gaurav Kwok MD     Plan of care signed (Y/N):     Date of Patient follow up with Physician:      Plan of Care Report: NO  POC update due: (10 visits /OR AUTH LIMITS, whichever is less)  10/4/2024                                             Medical History:  Comorbidities:  Cancer/Tumor  Hypertension  Osteoporosis/Osteopenia  Osteoarthritis  Relevant Medical History: left MICHAEL                                         Precautions/ Contra-indications:           Latex allergy:  NO  Pacemaker:    NO  Contraindications for Manipulation: None  Date of Surgery: NA  Other:    Red Flags:  None    Suicide Screening:   The patient did not verbalize a primary behavioral concern, suicidal ideation, suicidal intent, or demonstrate suicidal behaviors.    Preferred Language for Healthcare:   [x] English       [] other:    SUBJECTIVE EXAMINATION     Patient stated complaint: She has made modifications in reformer pilates which has helped her low back pain. The seated trunk flexion stretch and self traction have really helped though she has trouble finding a table that is the right height for that.        Test used Initial score  2024   Pain Summary

## 2024-09-12 ENCOUNTER — HOSPITAL ENCOUNTER (OUTPATIENT)
Dept: PHYSICAL THERAPY | Age: 84
Setting detail: THERAPIES SERIES
Discharge: HOME | End: 2024-09-12
Payer: MEDICARE

## 2024-09-12 PROCEDURE — 97110 THERAPEUTIC EXERCISES: CPT

## 2024-09-12 PROCEDURE — 97140 MANUAL THERAPY 1/> REGIONS: CPT

## 2024-09-19 ENCOUNTER — HOSPITAL ENCOUNTER (OUTPATIENT)
Dept: PHYSICAL THERAPY | Age: 84
Setting detail: THERAPIES SERIES
Discharge: HOME | End: 2024-09-19
Payer: MEDICARE

## 2024-09-19 PROCEDURE — 97110 THERAPEUTIC EXERCISES: CPT

## 2024-09-19 PROCEDURE — 97140 MANUAL THERAPY 1/> REGIONS: CPT

## 2024-09-26 ENCOUNTER — HOSPITAL ENCOUNTER (OUTPATIENT)
Dept: PHYSICAL THERAPY | Age: 84
Setting detail: THERAPIES SERIES
Discharge: HOME | End: 2024-09-26
Payer: MEDICARE

## 2024-09-26 PROCEDURE — 97140 MANUAL THERAPY 1/> REGIONS: CPT
